# Patient Record
Sex: FEMALE | Race: WHITE | NOT HISPANIC OR LATINO | Employment: STUDENT | URBAN - METROPOLITAN AREA
[De-identification: names, ages, dates, MRNs, and addresses within clinical notes are randomized per-mention and may not be internally consistent; named-entity substitution may affect disease eponyms.]

---

## 2021-10-06 ENCOUNTER — PATIENT MESSAGE (OUTPATIENT)
Dept: DERMATOLOGY | Facility: CLINIC | Age: 26
End: 2021-10-06

## 2021-10-08 ENCOUNTER — OFFICE VISIT (OUTPATIENT)
Dept: DERMATOLOGY | Facility: CLINIC | Age: 26
End: 2021-10-08
Payer: COMMERCIAL

## 2021-10-08 DIAGNOSIS — D22.60 MULTIPLE BENIGN MELANOCYTIC NEVI OF UPPER EXTREMITY, LOWER EXTREMITY, AND TRUNK: ICD-10-CM

## 2021-10-08 DIAGNOSIS — D22.30 MELANOCYTIC NEVI OF FACE: ICD-10-CM

## 2021-10-08 DIAGNOSIS — D22.70 MULTIPLE BENIGN MELANOCYTIC NEVI OF UPPER EXTREMITY, LOWER EXTREMITY, AND TRUNK: ICD-10-CM

## 2021-10-08 DIAGNOSIS — D22.5 MULTIPLE BENIGN MELANOCYTIC NEVI OF UPPER EXTREMITY, LOWER EXTREMITY, AND TRUNK: ICD-10-CM

## 2021-10-08 DIAGNOSIS — L81.4 LENTIGINES: Primary | ICD-10-CM

## 2021-10-08 DIAGNOSIS — Z12.83 SCREENING EXAM FOR SKIN CANCER: ICD-10-CM

## 2021-10-08 DIAGNOSIS — D22.4 MELANOCYTIC NEVUS OF SCALP: ICD-10-CM

## 2021-10-08 PROCEDURE — 1160F RVW MEDS BY RX/DR IN RCRD: CPT | Mod: CPTII,S$GLB,, | Performed by: DERMATOLOGY

## 2021-10-08 PROCEDURE — 99203 OFFICE O/P NEW LOW 30 MIN: CPT | Mod: S$GLB,,, | Performed by: DERMATOLOGY

## 2021-10-08 PROCEDURE — 99203 PR OFFICE/OUTPT VISIT, NEW, LEVL III, 30-44 MIN: ICD-10-PCS | Mod: S$GLB,,, | Performed by: DERMATOLOGY

## 2021-10-08 PROCEDURE — 1159F PR MEDICATION LIST DOCUMENTED IN MEDICAL RECORD: ICD-10-PCS | Mod: CPTII,S$GLB,, | Performed by: DERMATOLOGY

## 2021-10-08 PROCEDURE — 1159F MED LIST DOCD IN RCRD: CPT | Mod: CPTII,S$GLB,, | Performed by: DERMATOLOGY

## 2021-10-08 PROCEDURE — 1160F PR REVIEW ALL MEDS BY PRESCRIBER/CLIN PHARMACIST DOCUMENTED: ICD-10-PCS | Mod: CPTII,S$GLB,, | Performed by: DERMATOLOGY

## 2021-11-04 ENCOUNTER — TELEPHONE (OUTPATIENT)
Dept: GYNECOLOGIC ONCOLOGY | Facility: CLINIC | Age: 26
End: 2021-11-04
Payer: COMMERCIAL

## 2021-11-09 ENCOUNTER — LAB VISIT (OUTPATIENT)
Dept: LAB | Facility: OTHER | Age: 26
End: 2021-11-09
Attending: FAMILY MEDICINE
Payer: COMMERCIAL

## 2021-11-09 ENCOUNTER — CLINICAL SUPPORT (OUTPATIENT)
Dept: GYNECOLOGIC ONCOLOGY | Facility: CLINIC | Age: 26
End: 2021-11-09
Payer: COMMERCIAL

## 2021-11-09 DIAGNOSIS — Z80.0 FAMILY HISTORY OF LYNCH SYNDROME: ICD-10-CM

## 2021-11-09 DIAGNOSIS — Z80.49 FAMILY HISTORY OF UTERINE CANCER: ICD-10-CM

## 2021-11-09 DIAGNOSIS — Z80.0 FAMILY HISTORY OF LYNCH SYNDROME: Primary | ICD-10-CM

## 2021-11-09 PROCEDURE — 36415 COLL VENOUS BLD VENIPUNCTURE: CPT | Performed by: OBSTETRICS & GYNECOLOGY

## 2021-11-23 LAB — COLARIS: NORMAL

## 2021-12-02 ENCOUNTER — TELEPHONE (OUTPATIENT)
Dept: GYNECOLOGIC ONCOLOGY | Facility: CLINIC | Age: 26
End: 2021-12-02
Payer: COMMERCIAL

## 2021-12-02 DIAGNOSIS — Z15.09 LYNCH SYNDROME: Primary | ICD-10-CM

## 2021-12-09 ENCOUNTER — TELEPHONE (OUTPATIENT)
Dept: GYNECOLOGIC ONCOLOGY | Facility: CLINIC | Age: 26
End: 2021-12-09
Payer: COMMERCIAL

## 2021-12-09 ENCOUNTER — PATIENT MESSAGE (OUTPATIENT)
Dept: GASTROENTEROLOGY | Facility: CLINIC | Age: 26
End: 2021-12-09
Payer: COMMERCIAL

## 2021-12-09 ENCOUNTER — PATIENT MESSAGE (OUTPATIENT)
Dept: GYNECOLOGIC ONCOLOGY | Facility: CLINIC | Age: 26
End: 2021-12-09
Payer: COMMERCIAL

## 2021-12-10 ENCOUNTER — PATIENT MESSAGE (OUTPATIENT)
Dept: GYNECOLOGIC ONCOLOGY | Facility: CLINIC | Age: 26
End: 2021-12-10
Payer: COMMERCIAL

## 2022-01-03 ENCOUNTER — HOSPITAL ENCOUNTER (OUTPATIENT)
Dept: RADIOLOGY | Facility: OTHER | Age: 27
Discharge: HOME | End: 2022-01-03
Attending: OBSTETRICS & GYNECOLOGY
Payer: COMMERCIAL

## 2022-01-03 DIAGNOSIS — Z15.09 LYNCH SYNDROME: ICD-10-CM

## 2022-01-03 PROCEDURE — 76856 US PELVIS COMP WITH TRANSVAG NON-OB (XPD): ICD-10-PCS | Mod: 26,,, | Performed by: RADIOLOGY

## 2022-01-03 PROCEDURE — 76830 TRANSVAGINAL US NON-OB: CPT | Mod: TC

## 2022-01-03 PROCEDURE — 76856 US EXAM PELVIC COMPLETE: CPT | Mod: 26,,, | Performed by: RADIOLOGY

## 2022-01-03 PROCEDURE — 76830 US PELVIS COMP WITH TRANSVAG NON-OB (XPD): ICD-10-PCS | Mod: 26,,, | Performed by: RADIOLOGY

## 2022-01-03 PROCEDURE — 76830 TRANSVAGINAL US NON-OB: CPT | Mod: 26,,, | Performed by: RADIOLOGY

## 2022-01-10 ENCOUNTER — OFFICE VISIT (OUTPATIENT)
Dept: GYNECOLOGIC ONCOLOGY | Facility: CLINIC | Age: 27
End: 2022-01-10
Payer: COMMERCIAL

## 2022-01-10 VITALS
DIASTOLIC BLOOD PRESSURE: 58 MMHG | WEIGHT: 145.31 LBS | SYSTOLIC BLOOD PRESSURE: 116 MMHG | BODY MASS INDEX: 24.55 KG/M2 | HEART RATE: 60 BPM

## 2022-01-10 DIAGNOSIS — Z15.09 LYNCH SYNDROME: ICD-10-CM

## 2022-01-10 PROCEDURE — 3078F DIAST BP <80 MM HG: CPT | Mod: CPTII,S$GLB,, | Performed by: OBSTETRICS & GYNECOLOGY

## 2022-01-10 PROCEDURE — 1159F PR MEDICATION LIST DOCUMENTED IN MEDICAL RECORD: ICD-10-PCS | Mod: CPTII,S$GLB,, | Performed by: OBSTETRICS & GYNECOLOGY

## 2022-01-10 PROCEDURE — 1160F PR REVIEW ALL MEDS BY PRESCRIBER/CLIN PHARMACIST DOCUMENTED: ICD-10-PCS | Mod: CPTII,S$GLB,, | Performed by: OBSTETRICS & GYNECOLOGY

## 2022-01-10 PROCEDURE — 99999 PR PBB SHADOW E&M-EST. PATIENT-LVL III: ICD-10-PCS | Mod: PBBFAC,,, | Performed by: OBSTETRICS & GYNECOLOGY

## 2022-01-10 PROCEDURE — 3074F PR MOST RECENT SYSTOLIC BLOOD PRESSURE < 130 MM HG: ICD-10-PCS | Mod: CPTII,S$GLB,, | Performed by: OBSTETRICS & GYNECOLOGY

## 2022-01-10 PROCEDURE — 99999 PR PBB SHADOW E&M-EST. PATIENT-LVL III: CPT | Mod: PBBFAC,,, | Performed by: OBSTETRICS & GYNECOLOGY

## 2022-01-10 PROCEDURE — 3008F PR BODY MASS INDEX (BMI) DOCUMENTED: ICD-10-PCS | Mod: CPTII,S$GLB,, | Performed by: OBSTETRICS & GYNECOLOGY

## 2022-01-10 PROCEDURE — 1160F RVW MEDS BY RX/DR IN RCRD: CPT | Mod: CPTII,S$GLB,, | Performed by: OBSTETRICS & GYNECOLOGY

## 2022-01-10 PROCEDURE — 99205 OFFICE O/P NEW HI 60 MIN: CPT | Mod: S$GLB,,, | Performed by: OBSTETRICS & GYNECOLOGY

## 2022-01-10 PROCEDURE — 99205 PR OFFICE/OUTPT VISIT, NEW, LEVL V, 60-74 MIN: ICD-10-PCS | Mod: S$GLB,,, | Performed by: OBSTETRICS & GYNECOLOGY

## 2022-01-10 PROCEDURE — 3074F SYST BP LT 130 MM HG: CPT | Mod: CPTII,S$GLB,, | Performed by: OBSTETRICS & GYNECOLOGY

## 2022-01-10 PROCEDURE — 3008F BODY MASS INDEX DOCD: CPT | Mod: CPTII,S$GLB,, | Performed by: OBSTETRICS & GYNECOLOGY

## 2022-01-10 PROCEDURE — 1159F MED LIST DOCD IN RCRD: CPT | Mod: CPTII,S$GLB,, | Performed by: OBSTETRICS & GYNECOLOGY

## 2022-01-10 PROCEDURE — 3078F PR MOST RECENT DIASTOLIC BLOOD PRESSURE < 80 MM HG: ICD-10-PCS | Mod: CPTII,S$GLB,, | Performed by: OBSTETRICS & GYNECOLOGY

## 2022-01-10 NOTE — Clinical Note
Hey.  Can you please see her for annual gyn care?  She needs a gyn until she transitions back to us.  She requested [facility] or [place] as she is a  and that would be easier.  Nothing special to do for her. Fernando

## 2022-01-10 NOTE — PROGRESS NOTES
Subjective:      Patient ID: Erma Garland is a 26 y.o. female.    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  Patient's mother had endometrial cancer, and this prompted the testing. Recently had a TVUS and CA-125 that were both normal.  Denies PSH.  LMP unknown due to continuous OCP's.  FH previously documented.  Mother was in her 40's when diagnosed with endometrial.  No breast or other gyn cancers.  No colon cancer.  She refuses exam today because she would like a female provider.  Also does not have a primary gyn.  Review of Systems   Constitutional: Negative for activity change, appetite change, chills, fatigue and fever.   HENT: Negative for hearing loss, mouth sores, nosebleeds, sore throat and tinnitus.    Eyes: Negative for visual disturbance.   Respiratory: Negative for cough, chest tightness, shortness of breath and wheezing.    Cardiovascular: Negative for chest pain and leg swelling.   Gastrointestinal: Negative for abdominal distention, abdominal pain, blood in stool, constipation, diarrhea, nausea and vomiting.   Genitourinary: Negative for dysuria, flank pain, frequency, hematuria, pelvic pain, vaginal bleeding, vaginal discharge and vaginal pain.   Musculoskeletal: Negative for arthralgias and back pain.   Skin: Negative for rash.   Neurological: Negative for dizziness, seizures, syncope, weakness and numbness.   Hematological: Does not bruise/bleed easily.   Psychiatric/Behavioral: Negative for confusion and sleep disturbance. The patient is not nervous/anxious.        Past Medical History:   Diagnosis Date    Acne      History reviewed. No pertinent surgical history.  Family History   Problem Relation Age of Onset    Ovarian cancer Mother     Melanoma Neg Hx      Social History     Socioeconomic History    Marital status: Single    Number of  children: 0   Occupational History    Occupation: student     Comment: [employer]   Tobacco Use    Smoking status: Never Smoker    Smokeless tobacco: Never Used   Substance and Sexual Activity    Alcohol use: No    Drug use: No    Sexual activity: Never   Other Topics Concern    Are you pregnant or think you may be? No    Breast-feeding No     Current Outpatient Medications   Medication Sig    alprazolam (XANAX) 0.25 MG tablet Take 1 tablet (0.25 mg total) by mouth daily as needed for Anxiety (prior to flying).    norgestimate-ethinyl estradiol (TRI-PREVIFEM, 28,) 0.18/0.215/0.25 mg-35 mcg (28) tablet Take 1 tablet by mouth once daily.     No current facility-administered medications for this visit.     Review of patient's allergies indicates:   Allergen Reactions    Doxycycline      Hives from sun       Objective:   Physical Exam:   Constitutional: She is oriented to person, place, and time. She appears well-developed and well-nourished. No distress.    HENT:   Head: Normocephalic and atraumatic.    Eyes: No scleral icterus.     Cardiovascular: Normal rate and intact distal pulses.  Exam reveals no cyanosis and no edema.     Pulmonary/Chest: Effort normal. No respiratory distress. She exhibits no tenderness.        Abdominal: Normal appearance. She exhibits no distension and no ascites. There is no rigidity.     Genitourinary:    Vagina and uterus normal.   Cervix is normal. Right adnexum displays no mass, no tenderness and no fullness. Left adnexum displays no mass, no tenderness and no fullness. No  no vaginal discharge or bleeding in the vagina. Uterus is not tender.           Musculoskeletal: Normal range of motion and moves all extremeties. No edema.      Lymphadenopathy:     She has no cervical adenopathy.        Right: No inguinal adenopathy present.        Left: No inguinal adenopathy present.    Neurological: She is alert and oriented to person, place, and time.    Skin: Skin is warm. No rash  noted. No cyanosis or erythema.    Psychiatric: She has a normal mood and affect. Thought content normal.       Assessment:     1. Saravia syndrome        Plan:       Patient counseled on her MSH2 mutation and impact moving forward.  Understands that with baseline negative testing, there is nothing to do now from a surveillance standpoint except for annual visits.  Increased screening to possibly include EMB will begin at age 31.  Has GI referral already and was seen in the breast clinic and has established care.  Will be transitioning off her parents medical insurance policy at the end of this year. Continue OCP's for now given protective ovarian cancer benefit.  Plan to have her see Ari when she starts and will find interim gyn provider in [facility] until then.

## 2022-01-10 NOTE — Clinical Note
Ari doesn't have a schedule yet, but she will need to see her in September or October to establish care.

## 2022-01-29 ENCOUNTER — TELEPHONE (OUTPATIENT)
Dept: ENDOSCOPY | Facility: HOSPITAL | Age: 27
End: 2022-01-29
Payer: COMMERCIAL

## 2022-01-31 ENCOUNTER — LAB VISIT (OUTPATIENT)
Dept: LAB | Facility: HOSPITAL | Age: 27
End: 2022-01-31
Attending: INTERNAL MEDICINE
Payer: COMMERCIAL

## 2022-01-31 ENCOUNTER — OFFICE VISIT (OUTPATIENT)
Dept: INTERNAL MEDICINE | Facility: CLINIC | Age: 27
End: 2022-01-31
Payer: COMMERCIAL

## 2022-01-31 VITALS
SYSTOLIC BLOOD PRESSURE: 114 MMHG | OXYGEN SATURATION: 98 % | HEART RATE: 64 BPM | WEIGHT: 147 LBS | BODY MASS INDEX: 27.05 KG/M2 | HEIGHT: 62 IN | DIASTOLIC BLOOD PRESSURE: 62 MMHG

## 2022-01-31 DIAGNOSIS — Z15.09 LYNCH SYNDROME: ICD-10-CM

## 2022-01-31 DIAGNOSIS — Z00.00 VISIT FOR ANNUAL HEALTH EXAMINATION: Primary | ICD-10-CM

## 2022-01-31 DIAGNOSIS — F32.A DEPRESSION, UNSPECIFIED DEPRESSION TYPE: ICD-10-CM

## 2022-01-31 DIAGNOSIS — F41.1 GAD (GENERALIZED ANXIETY DISORDER): ICD-10-CM

## 2022-01-31 DIAGNOSIS — F41.8 SITUATIONAL ANXIETY: ICD-10-CM

## 2022-01-31 DIAGNOSIS — Z12.4 ENCOUNTER FOR SCREENING FOR CERVICAL CANCER: ICD-10-CM

## 2022-01-31 DIAGNOSIS — Z80.0 FAMILY HISTORY OF LYNCH SYNDROME: ICD-10-CM

## 2022-01-31 LAB
ALBUMIN SERPL BCP-MCNC: 4 G/DL (ref 3.5–5.2)
ALP SERPL-CCNC: 34 U/L (ref 55–135)
ALT SERPL W/O P-5'-P-CCNC: 16 U/L (ref 10–44)
ANION GAP SERPL CALC-SCNC: 9 MMOL/L (ref 8–16)
AST SERPL-CCNC: 20 U/L (ref 10–40)
BASOPHILS # BLD AUTO: 0.03 K/UL (ref 0–0.2)
BASOPHILS NFR BLD: 0.6 % (ref 0–1.9)
BILIRUB SERPL-MCNC: 0.3 MG/DL (ref 0.1–1)
BUN SERPL-MCNC: 8 MG/DL (ref 6–20)
CALCIUM SERPL-MCNC: 9.3 MG/DL (ref 8.7–10.5)
CHLORIDE SERPL-SCNC: 106 MMOL/L (ref 95–110)
CHOLEST SERPL-MCNC: 199 MG/DL (ref 120–199)
CHOLEST/HDLC SERPL: 3.3 {RATIO} (ref 2–5)
CO2 SERPL-SCNC: 22 MMOL/L (ref 23–29)
CREAT SERPL-MCNC: 0.8 MG/DL (ref 0.5–1.4)
DIFFERENTIAL METHOD: ABNORMAL
EOSINOPHIL # BLD AUTO: 0.2 K/UL (ref 0–0.5)
EOSINOPHIL NFR BLD: 4.5 % (ref 0–8)
ERYTHROCYTE [DISTWIDTH] IN BLOOD BY AUTOMATED COUNT: 12.5 % (ref 11.5–14.5)
EST. GFR  (AFRICAN AMERICAN): >60 ML/MIN/1.73 M^2
EST. GFR  (NON AFRICAN AMERICAN): >60 ML/MIN/1.73 M^2
ESTIMATED AVG GLUCOSE: 103 MG/DL (ref 68–131)
GLUCOSE SERPL-MCNC: 57 MG/DL (ref 70–110)
HBA1C MFR BLD: 5.2 % (ref 4–5.6)
HCT VFR BLD AUTO: 39.6 % (ref 37–48.5)
HDLC SERPL-MCNC: 60 MG/DL (ref 40–75)
HDLC SERPL: 30.2 % (ref 20–50)
HGB BLD-MCNC: 12.3 G/DL (ref 12–16)
IMM GRANULOCYTES # BLD AUTO: 0.01 K/UL (ref 0–0.04)
IMM GRANULOCYTES NFR BLD AUTO: 0.2 % (ref 0–0.5)
LDLC SERPL CALC-MCNC: 125.4 MG/DL (ref 63–159)
LYMPHOCYTES # BLD AUTO: 2.2 K/UL (ref 1–4.8)
LYMPHOCYTES NFR BLD: 46 % (ref 18–48)
MCH RBC QN AUTO: 28.3 PG (ref 27–31)
MCHC RBC AUTO-ENTMCNC: 31.1 G/DL (ref 32–36)
MCV RBC AUTO: 91 FL (ref 82–98)
MONOCYTES # BLD AUTO: 0.4 K/UL (ref 0.3–1)
MONOCYTES NFR BLD: 8.1 % (ref 4–15)
NEUTROPHILS # BLD AUTO: 1.9 K/UL (ref 1.8–7.7)
NEUTROPHILS NFR BLD: 40.6 % (ref 38–73)
NONHDLC SERPL-MCNC: 139 MG/DL
NRBC BLD-RTO: 0 /100 WBC
PLATELET # BLD AUTO: 201 K/UL (ref 150–450)
PMV BLD AUTO: 11 FL (ref 9.2–12.9)
POTASSIUM SERPL-SCNC: 4.2 MMOL/L (ref 3.5–5.1)
PROT SERPL-MCNC: 7.5 G/DL (ref 6–8.4)
RBC # BLD AUTO: 4.34 M/UL (ref 4–5.4)
SODIUM SERPL-SCNC: 137 MMOL/L (ref 136–145)
TRIGL SERPL-MCNC: 68 MG/DL (ref 30–150)
TSH SERPL DL<=0.005 MIU/L-ACNC: 2.12 UIU/ML (ref 0.4–4)
WBC # BLD AUTO: 4.7 K/UL (ref 3.9–12.7)

## 2022-01-31 PROCEDURE — 80061 LIPID PANEL: CPT | Performed by: INTERNAL MEDICINE

## 2022-01-31 PROCEDURE — 3078F PR MOST RECENT DIASTOLIC BLOOD PRESSURE < 80 MM HG: ICD-10-PCS | Mod: CPTII,S$GLB,, | Performed by: INTERNAL MEDICINE

## 2022-01-31 PROCEDURE — 99999 PR PBB SHADOW E&M-EST. PATIENT-LVL IV: ICD-10-PCS | Mod: PBBFAC,,, | Performed by: INTERNAL MEDICINE

## 2022-01-31 PROCEDURE — 36415 COLL VENOUS BLD VENIPUNCTURE: CPT | Performed by: INTERNAL MEDICINE

## 2022-01-31 PROCEDURE — 1160F RVW MEDS BY RX/DR IN RCRD: CPT | Mod: CPTII,S$GLB,, | Performed by: INTERNAL MEDICINE

## 2022-01-31 PROCEDURE — 3008F BODY MASS INDEX DOCD: CPT | Mod: CPTII,S$GLB,, | Performed by: INTERNAL MEDICINE

## 2022-01-31 PROCEDURE — 3074F PR MOST RECENT SYSTOLIC BLOOD PRESSURE < 130 MM HG: ICD-10-PCS | Mod: CPTII,S$GLB,, | Performed by: INTERNAL MEDICINE

## 2022-01-31 PROCEDURE — 3008F PR BODY MASS INDEX (BMI) DOCUMENTED: ICD-10-PCS | Mod: CPTII,S$GLB,, | Performed by: INTERNAL MEDICINE

## 2022-01-31 PROCEDURE — 1159F PR MEDICATION LIST DOCUMENTED IN MEDICAL RECORD: ICD-10-PCS | Mod: CPTII,S$GLB,, | Performed by: INTERNAL MEDICINE

## 2022-01-31 PROCEDURE — 99385 PR PREVENTIVE VISIT,NEW,18-39: ICD-10-PCS | Mod: S$GLB,,, | Performed by: INTERNAL MEDICINE

## 2022-01-31 PROCEDURE — 3078F DIAST BP <80 MM HG: CPT | Mod: CPTII,S$GLB,, | Performed by: INTERNAL MEDICINE

## 2022-01-31 PROCEDURE — 99999 PR PBB SHADOW E&M-EST. PATIENT-LVL IV: CPT | Mod: PBBFAC,,, | Performed by: INTERNAL MEDICINE

## 2022-01-31 PROCEDURE — 85025 COMPLETE CBC W/AUTO DIFF WBC: CPT | Performed by: INTERNAL MEDICINE

## 2022-01-31 PROCEDURE — 1160F PR REVIEW ALL MEDS BY PRESCRIBER/CLIN PHARMACIST DOCUMENTED: ICD-10-PCS | Mod: CPTII,S$GLB,, | Performed by: INTERNAL MEDICINE

## 2022-01-31 PROCEDURE — 1159F MED LIST DOCD IN RCRD: CPT | Mod: CPTII,S$GLB,, | Performed by: INTERNAL MEDICINE

## 2022-01-31 PROCEDURE — 3074F SYST BP LT 130 MM HG: CPT | Mod: CPTII,S$GLB,, | Performed by: INTERNAL MEDICINE

## 2022-01-31 PROCEDURE — 84443 ASSAY THYROID STIM HORMONE: CPT | Performed by: INTERNAL MEDICINE

## 2022-01-31 PROCEDURE — 83036 HEMOGLOBIN GLYCOSYLATED A1C: CPT | Performed by: INTERNAL MEDICINE

## 2022-01-31 PROCEDURE — 99385 PREV VISIT NEW AGE 18-39: CPT | Mod: S$GLB,,, | Performed by: INTERNAL MEDICINE

## 2022-01-31 PROCEDURE — 80053 COMPREHEN METABOLIC PANEL: CPT | Performed by: INTERNAL MEDICINE

## 2022-01-31 RX ORDER — ALPRAZOLAM 0.25 MG/1
0.25 TABLET ORAL DAILY PRN
Qty: 5 TABLET | Refills: 0 | Status: SHIPPED | OUTPATIENT
Start: 2022-01-31 | End: 2022-04-07 | Stop reason: SDUPTHER

## 2022-01-31 NOTE — PROGRESS NOTES
"INTERNAL MEDICINE INITIAL VISIT NOTE      CHIEF COMPLAINT     Chief Complaint   Patient presents with    Annual Exam    Establish Care    Anxiety       HPI     Erma Garland is a 26 y.o. female with recently diagnosed Saravia syndrome, KALEIGH, depression, here today to establish care. PCP listed as Dr. Erma Rudolph, transferring care.    Mood disorders overall well-controlled. Not currently seeing a therapist. Previously on Fluoxetine, has been off medications for some time. Requesting refill of Xanax due to upcoming flight; previously has had panic attack during flight.     Recent evaluation by Gyn-Onc given Saravia syndrome diagnosis. Has MSH2 mutation; CA-125 and TVUS both normal.     Upcoming appt with Gastroenterology. Occasionally experiences abdominal bloating, would like to know which probiotic to take.      Past Medical History:  Past Medical History:   Diagnosis Date    Acne     Anxiety     Depression     Saravia syndrome        Review of Systems:  Review of Systems   Constitutional: Negative for chills and fever.   HENT: Negative for congestion.    Respiratory: Negative for cough and shortness of breath.    Cardiovascular: Negative for chest pain.   Gastrointestinal: Negative for constipation, nausea and vomiting.   Genitourinary: Negative for hematuria and urgency.   Musculoskeletal: Negative for falls.   Skin: Negative for rash.   Neurological: Negative for dizziness and loss of consciousness.       Health Maintenance:   Immunizations:   Influenza - defer  Tdap - next visit  Covid 19 - complete  HPV - complete  Prevnar rec at 66  Shingrix rec at 51    Cancer Screening:  PAP: refer to Gyn  Mammogram:  n/a  Colonoscopy:  n/a  DEXA:  n/a      PHYSICAL EXAM     /62 (BP Location: Left arm, Patient Position: Sitting, BP Method: Medium (Manual))   Pulse 64   Ht 5' 2" (1.575 m)   Wt 66.7 kg (147 lb)   LMP 12/25/2021 (Approximate)   SpO2 98%   BMI 26.89 kg/m²     GEN - A+OX4, NAD   HEENT - " PERRL, EOMI,   Neck - No thyromegaly or thyroid masses felt.  No cervical lymphadenopathy appreciated.  CV - RRR, no m/r/g  Chest - CTAB, no wheezing, crackles, or rhonchi  Abd - S/NT/ND/+BS.   Ext - 2+BDP. No C/C/E.  Skin - Normal color and texture, no rash, no skin lesions.    ASSESSMENT/PLAN     Erma Garland is a 26 y.o. female with conditions as above.     Visit for annual health examination    Saravia syndrome  MSH2 mutation   Established with Gyn-Onc, CA-125 and TVUS both normal  Upcoming appt with GI, discuss timeline of starting CRC screening  -     CBC Auto Differential; Future; Expected date: 01/31/2022  -     Comprehensive Metabolic Panel; Future; Expected date: 01/31/2022  -     Hemoglobin A1C; Future; Expected date: 01/31/2022  -     Lipid Panel; Future; Expected date: 01/31/2022    KALEIGH (generalized anxiety disorder)  Stable  -     TSH; Future; Expected date: 01/31/2022  -     ALPRAZolam (XANAX) 0.25 MG tablet; Take 1 tablet (0.25 mg total) by mouth daily as needed for Anxiety (prior to flying).  Dispense: 5 tablet; Refill: 0    Depression, unspecified depression type  Stable    Situational anxiety   checked, limited quantity of Xanax for upcoming travel  -     ALPRAZolam (XANAX) 0.25 MG tablet; Take 1 tablet (0.25 mg total) by mouth daily as needed for Anxiety (prior to flying).  Dispense: 5 tablet; Refill: 0    Encounter for screening for cervical cancer  -     Ambulatory referral/consult to Gynecology; Future; Expected date: 02/07/2022    Family history of Saravia syndrome  Mother with endometrial cancer; no relatives with CRC    HM as above    RTC in 1 year, sooner if needed and depending on labs.    Leann Dalal MD  Department of Internal Medicine - [facility]  01/31/2022

## 2022-02-02 ENCOUNTER — PATIENT MESSAGE (OUTPATIENT)
Dept: ENDOSCOPY | Facility: HOSPITAL | Age: 27
End: 2022-02-02
Payer: COMMERCIAL

## 2022-02-11 ENCOUNTER — OFFICE VISIT (OUTPATIENT)
Dept: GASTROENTEROLOGY | Facility: CLINIC | Age: 27
End: 2022-02-11
Payer: COMMERCIAL

## 2022-02-11 VITALS
SYSTOLIC BLOOD PRESSURE: 123 MMHG | HEIGHT: 64 IN | WEIGHT: 144.81 LBS | HEART RATE: 90 BPM | BODY MASS INDEX: 24.72 KG/M2 | OXYGEN SATURATION: 100 % | DIASTOLIC BLOOD PRESSURE: 60 MMHG

## 2022-02-11 DIAGNOSIS — Z15.09 LYNCH SYNDROME: Primary | ICD-10-CM

## 2022-02-11 PROCEDURE — 99203 OFFICE O/P NEW LOW 30 MIN: CPT | Mod: S$GLB,,, | Performed by: INTERNAL MEDICINE

## 2022-02-11 PROCEDURE — 99203 PR OFFICE/OUTPT VISIT, NEW, LEVL III, 30-44 MIN: ICD-10-PCS | Mod: S$GLB,,, | Performed by: INTERNAL MEDICINE

## 2022-02-11 PROCEDURE — 99999 PR PBB SHADOW E&M-EST. PATIENT-LVL IV: ICD-10-PCS | Mod: PBBFAC,,, | Performed by: INTERNAL MEDICINE

## 2022-02-11 PROCEDURE — 99999 PR PBB SHADOW E&M-EST. PATIENT-LVL IV: CPT | Mod: PBBFAC,,, | Performed by: INTERNAL MEDICINE

## 2022-02-11 NOTE — PROGRESS NOTES
[facility] Consultation     Reason for Consult:  The encounter diagnosis was Saravia syndrome.    PCP:   Eulalio Dalal   [address] [facility] [address]    Referring MD:  Fernando Zarate Md  [address] [place] [address]    HPI:  This is a 26 y.o. female with Saravia syndrome (MSH2 genetic mutation), anxiety who presents to GI clinic.     Mother with ovarian and endometrial cancer. Patient and sister had genetic testing done in November 2021.   Maternal grandfather with brain cancer.  Maternal aunt with thyroid cancer.    Paternal grandmother also with cancer, unclear specifics.  Denies family history of pancreatic cancer or small bowel cancers.    Drinks alcohol socially. Denies tobacco abuse. Smokes THC for medicinal purposes daily.    Denies any rectal bleeding. Brown in color.   She takes probiotics for bloating. She feels that the probiotics help. Denies nausea, vomiting, denies weight loss.     She is taking oral contraceptives.  Used to take medications for anxiety/depression - not taking them.      ROS:  Constitutional: No fevers, chills, No weight loss  ENT: No allergies  CV: No chest pain  Pulm: No cough, No shortness of breath  Ophtho: No vision changes  GI: see HPI  Derm: No rash  Heme: No lymphadenopathy, No bruising  MSK: No arthritis  : No dysuria, No hematuria  Endo: No hot or cold intolerance  Neuro: No syncope, No seizure  Psych: No anxiety, No depression    Medical History:  has a past medical history of Acne, Anxiety, Depression, and Saravia syndrome.    Surgical History:  has a past surgical history that includes [place] tooth extraction.    Family History: family history includes Ovarian cancer in her mother..   No contributory family history     Social History:  reports that she has never smoked. She has never used smokeless tobacco. She reports current alcohol use of about 4.0 standard drinks of alcohol per week. She reports that she does not use  "drugs.    Review of patient's allergies indicates:   Allergen Reactions    Latex, natural rubber Itching and Swelling    Doxycycline      Hives from sun       Current Outpatient Rx   Medication Sig Dispense Refill    ALPRAZolam (XANAX) 0.25 MG tablet Take 1 tablet (0.25 mg total) by mouth daily as needed for Anxiety (prior to flying). 5 tablet 0    norgestimate-ethinyl estradiol (TRI-PREVIFEM, 28,) 0.18/0.215/0.25 mg-35 mcg (28) tablet Take 1 tablet by mouth once daily. 28 tablet 11       Objective Findings:    Vital Signs:  /60 (BP Location: Left arm, Patient Position: Sitting, BP Method: Medium (Automatic))   Pulse 90   Ht 5' 4" (1.626 m)   Wt 65.7 kg (144 lb 13.5 oz)   SpO2 100%   BMI 24.86 kg/m²   Body mass index is 24.86 kg/m².    Physical Exam:  General Appearance: well-appearing, NAD  Head:   Normocephalic, without obvious abnormality  Eyes:    No scleral icterus, EOMI  ENT: Neck supple; moist mucus membranes  Lungs: clear to auscultation bilaterally.  Heart:  regular rate and rhythm, S1, S2 normal, no murmurs heard  Abdomen: soft, non-tender, non-distended with bowel sounds in all four quadrants. No hepatosplenomegaly, ascites, or palpable masses  Extremities: 2+ pulses, no clubbing, cyanosis or edema  Skin: No visible rash  Neurologic: no focal motor deficits      Labs:  Lab Results   Component Value Date    WBC 4.70 01/31/2022    HGB 12.3 01/31/2022    HCT 39.6 01/31/2022     01/31/2022    CHOL 199 01/31/2022    TRIG 68 01/31/2022    HDL 60 01/31/2022    ALT 16 01/31/2022    AST 20 01/31/2022     01/31/2022    K 4.2 01/31/2022     01/31/2022    CREATININE 0.8 01/31/2022    BUN 8 01/31/2022    CO2 22 (L) 01/31/2022    TSH 2.117 01/31/2022    HGBA1C 5.2 01/31/2022       IMAGING:  US PELVIS 12/2021  Impression:  Unremarkable pelvic ultrasound.    PROCEDURES:  None on file    Assessment:  This is a 26 y.o. female with Saravia syndrome (MSH2 genetic mutation), anxiety who " presents to GI clinic.     Recommendations  -Colonoscopy now for CRC screening and every 1-2 years; case request placed  -Gastric cancer screening to begin with EGD at the age of 30-35 every 2-4 years  -Small intestinal cancer screening not routinely recommended unless family history of small bowel cancer in which case can perform VCE every 3-5 years  -Referral to genetics placed  -Begin ASA 81 daily for chemoprevention    Of note, patient states she may lose healthcare insurance this coming April - advised her to continue routine follow up with the appropriate specialities for her Saravia syndrome surveillance.    Order summary:  Orders Placed This Encounter    Ambulatory referral/consult to Genetics    Case Request Endoscopy: COLONOSCOPY         Thank you so much for allowing me to participate in the care of Erma Barbosa MD  Gastroenterology   [facility]

## 2022-02-15 ENCOUNTER — PATIENT MESSAGE (OUTPATIENT)
Dept: ENDOSCOPY | Facility: HOSPITAL | Age: 27
End: 2022-02-15
Payer: COMMERCIAL

## 2022-02-15 DIAGNOSIS — Z12.11 SPECIAL SCREENING FOR MALIGNANT NEOPLASMS, COLON: Primary | ICD-10-CM

## 2022-02-15 RX ORDER — POLYETHYLENE GLYCOL 3350, SODIUM SULFATE ANHYDROUS, SODIUM BICARBONATE, SODIUM CHLORIDE, POTASSIUM CHLORIDE 236; 22.74; 6.74; 5.86; 2.97 G/4L; G/4L; G/4L; G/4L; G/4L
4 POWDER, FOR SOLUTION ORAL ONCE
Qty: 4000 ML | Refills: 0 | Status: SHIPPED | OUTPATIENT
Start: 2022-02-15 | End: 2022-02-15

## 2022-02-27 ENCOUNTER — PATIENT MESSAGE (OUTPATIENT)
Dept: ENDOSCOPY | Facility: HOSPITAL | Age: 27
End: 2022-02-27
Payer: COMMERCIAL

## 2022-02-28 ENCOUNTER — PATIENT MESSAGE (OUTPATIENT)
Dept: ENDOSCOPY | Facility: HOSPITAL | Age: 27
End: 2022-02-28
Payer: COMMERCIAL

## 2022-02-28 DIAGNOSIS — Z12.11 COLON CANCER SCREENING: Primary | ICD-10-CM

## 2022-02-28 RX ORDER — POLYETHYLENE GLYCOL 3350, SODIUM SULFATE ANHYDROUS, SODIUM BICARBONATE, SODIUM CHLORIDE, POTASSIUM CHLORIDE 236; 22.74; 6.74; 5.86; 2.97 G/4L; G/4L; G/4L; G/4L; G/4L
4 POWDER, FOR SOLUTION ORAL ONCE
Qty: 4000 ML | Refills: 0 | Status: SHIPPED | OUTPATIENT
Start: 2022-02-28 | End: 2022-02-28

## 2022-03-02 DIAGNOSIS — Z12.11 COLON CANCER SCREENING: Primary | ICD-10-CM

## 2022-03-02 RX ORDER — SODIUM, POTASSIUM,MAG SULFATES 17.5-3.13G
1 SOLUTION, RECONSTITUTED, ORAL ORAL DAILY
Qty: 1 KIT | Refills: 0 | Status: SHIPPED | OUTPATIENT
Start: 2022-03-02 | End: 2022-03-04

## 2022-03-04 DIAGNOSIS — Z12.11 SCREEN FOR COLON CANCER: Primary | ICD-10-CM

## 2022-03-04 RX ORDER — SOD SULF/POT CHLORIDE/MAG SULF 1.479 G
12 TABLET ORAL DAILY
Qty: 24 TABLET | Refills: 0 | Status: SHIPPED | OUTPATIENT
Start: 2022-03-04 | End: 2023-08-02

## 2022-03-07 ENCOUNTER — PATIENT MESSAGE (OUTPATIENT)
Dept: INTERNAL MEDICINE | Facility: CLINIC | Age: 27
End: 2022-03-07
Payer: COMMERCIAL

## 2022-03-16 ENCOUNTER — PATIENT MESSAGE (OUTPATIENT)
Dept: ADMINISTRATIVE | Facility: HOSPITAL | Age: 27
End: 2022-03-16
Payer: COMMERCIAL

## 2022-03-17 ENCOUNTER — PATIENT MESSAGE (OUTPATIENT)
Dept: INTERNAL MEDICINE | Facility: CLINIC | Age: 27
End: 2022-03-17
Payer: COMMERCIAL

## 2022-03-17 DIAGNOSIS — G47.9 SLEEPING DIFFICULTIES: Primary | ICD-10-CM

## 2022-03-17 RX ORDER — HYDROXYZINE HYDROCHLORIDE 10 MG/1
10 TABLET, FILM COATED ORAL NIGHTLY PRN
Qty: 30 TABLET | Refills: 1 | Status: SHIPPED | OUTPATIENT
Start: 2022-03-17 | End: 2022-04-12

## 2022-03-23 ENCOUNTER — OFFICE VISIT (OUTPATIENT)
Dept: OBSTETRICS AND GYNECOLOGY | Facility: CLINIC | Age: 27
End: 2022-03-23
Payer: COMMERCIAL

## 2022-03-23 VITALS
WEIGHT: 143.94 LBS | BODY MASS INDEX: 24.57 KG/M2 | HEIGHT: 64 IN | SYSTOLIC BLOOD PRESSURE: 110 MMHG | DIASTOLIC BLOOD PRESSURE: 70 MMHG

## 2022-03-23 DIAGNOSIS — Z30.011 ENCOUNTER FOR INITIAL PRESCRIPTION OF CONTRACEPTIVE PILLS: ICD-10-CM

## 2022-03-23 DIAGNOSIS — Z15.09 LYNCH SYNDROME: ICD-10-CM

## 2022-03-23 DIAGNOSIS — Z01.419 WELL WOMAN EXAM WITH ROUTINE GYNECOLOGICAL EXAM: Primary | ICD-10-CM

## 2022-03-23 PROCEDURE — 3008F BODY MASS INDEX DOCD: CPT | Mod: CPTII,S$GLB,, | Performed by: OBSTETRICS & GYNECOLOGY

## 2022-03-23 PROCEDURE — 3044F HG A1C LEVEL LT 7.0%: CPT | Mod: CPTII,S$GLB,, | Performed by: OBSTETRICS & GYNECOLOGY

## 2022-03-23 PROCEDURE — 3074F SYST BP LT 130 MM HG: CPT | Mod: CPTII,S$GLB,, | Performed by: OBSTETRICS & GYNECOLOGY

## 2022-03-23 PROCEDURE — 3044F PR MOST RECENT HEMOGLOBIN A1C LEVEL <7.0%: ICD-10-PCS | Mod: CPTII,S$GLB,, | Performed by: OBSTETRICS & GYNECOLOGY

## 2022-03-23 PROCEDURE — 1159F MED LIST DOCD IN RCRD: CPT | Mod: CPTII,S$GLB,, | Performed by: OBSTETRICS & GYNECOLOGY

## 2022-03-23 PROCEDURE — 3074F PR MOST RECENT SYSTOLIC BLOOD PRESSURE < 130 MM HG: ICD-10-PCS | Mod: CPTII,S$GLB,, | Performed by: OBSTETRICS & GYNECOLOGY

## 2022-03-23 PROCEDURE — 87624 HPV HI-RISK TYP POOLED RSLT: CPT | Performed by: OBSTETRICS & GYNECOLOGY

## 2022-03-23 PROCEDURE — 1159F PR MEDICATION LIST DOCUMENTED IN MEDICAL RECORD: ICD-10-PCS | Mod: CPTII,S$GLB,, | Performed by: OBSTETRICS & GYNECOLOGY

## 2022-03-23 PROCEDURE — 3078F DIAST BP <80 MM HG: CPT | Mod: CPTII,S$GLB,, | Performed by: OBSTETRICS & GYNECOLOGY

## 2022-03-23 PROCEDURE — 99395 PREV VISIT EST AGE 18-39: CPT | Mod: S$GLB,,, | Performed by: OBSTETRICS & GYNECOLOGY

## 2022-03-23 PROCEDURE — 99395 PR PREVENTIVE VISIT,EST,18-39: ICD-10-PCS | Mod: S$GLB,,, | Performed by: OBSTETRICS & GYNECOLOGY

## 2022-03-23 PROCEDURE — 99999 PR PBB SHADOW E&M-EST. PATIENT-LVL III: CPT | Mod: PBBFAC,,, | Performed by: OBSTETRICS & GYNECOLOGY

## 2022-03-23 PROCEDURE — 3078F PR MOST RECENT DIASTOLIC BLOOD PRESSURE < 80 MM HG: ICD-10-PCS | Mod: CPTII,S$GLB,, | Performed by: OBSTETRICS & GYNECOLOGY

## 2022-03-23 PROCEDURE — 88175 CYTOPATH C/V AUTO FLUID REDO: CPT | Performed by: OBSTETRICS & GYNECOLOGY

## 2022-03-23 PROCEDURE — 3008F PR BODY MASS INDEX (BMI) DOCUMENTED: ICD-10-PCS | Mod: CPTII,S$GLB,, | Performed by: OBSTETRICS & GYNECOLOGY

## 2022-03-23 PROCEDURE — 99999 PR PBB SHADOW E&M-EST. PATIENT-LVL III: ICD-10-PCS | Mod: PBBFAC,,, | Performed by: OBSTETRICS & GYNECOLOGY

## 2022-03-23 RX ORDER — NORGESTIMATE AND ETHINYL ESTRADIOL 7DAYSX3 28
1 KIT ORAL DAILY
Qty: 84 TABLET | Refills: 3 | Status: SHIPPED | OUTPATIENT
Start: 2022-03-23 | End: 2022-04-08

## 2022-03-23 NOTE — PROGRESS NOTES
History & Physical  Gynecology      SUBJECTIVE:     Chief Complaint: Well Woman       History of Present Illness:  Annual Exam-Premenopausal  Patient presents for annual exam. The patient has no complaints today. Menstrual cycles are monthly.  The patient is sexually active. GYN screening history: last pap: approximate date 3 years ago  and was normal. The patient participates in regular exercise: yes.  Smoking status:  no cigarettes.    Contraception: OCP    FH:  Breast cancer: neg  Colon cancer: neg  Ovarian and Uterine cancer: mother, MSH2 mutation- Saravia    Review of patient's allergies indicates:   Allergen Reactions    Latex, natural rubber Itching and Swelling    Doxycycline      Hives from sun       Past Medical History:   Diagnosis Date    Acne     Anxiety     Depression     Saravia syndrome      Past Surgical History:   Procedure Laterality Date    WISDOM TOOTH EXTRACTION       OB History        0    Para   0    Term   0       0    AB   0    Living   0       SAB   0    IAB   0    Ectopic   0    Multiple   0    Live Births   0               Family History   Problem Relation Age of Onset    Ovarian cancer Mother         s/p hys    Endometrial cancer Mother     Melanoma Neg Hx      Social History     Tobacco Use    Smoking status: Never Smoker    Smokeless tobacco: Never Used   Substance Use Topics    Alcohol use: Yes     Alcohol/week: 4.0 standard drinks     Types: 1 Glasses of wine, 1 Cans of beer, 2 Shots of liquor per week     Comment: 3-5 drinks weekly     Drug use: No       Current Outpatient Medications   Medication Sig    ALPRAZolam (XANAX) 0.25 MG tablet Take 1 tablet (0.25 mg total) by mouth daily as needed for Anxiety (prior to flying).    hydrOXYzine HCL (ATARAX) 10 MG Tab Take 1 tablet (10 mg total) by mouth nightly as needed (insomnia).    norgestimate-ethinyl estradioL (TRI-PREVIFEM, 28,) 0.18/0.215/0.25 mg-35 mcg (28) tablet Take 1 tablet by mouth once daily.     sod sulf-pot chloride-mag sulf (SUTAB) 1.479-0.188- 0.225 gram tablet Take 12 tablets by mouth once daily. Take according to package instructions with indicated amount of water.     No current facility-administered medications for this visit.         Review of Systems:  Review of Systems   Constitutional: Negative for appetite change, fever and unexpected weight change.   Respiratory: Negative for shortness of breath.    Cardiovascular: Negative for chest pain.   Gastrointestinal: Negative for nausea and vomiting.   Genitourinary: Negative for menorrhagia, menstrual problem, pelvic pain, vaginal bleeding, vaginal discharge and vaginal pain.   Integumentary:  Negative for breast mass.   Breast: Negative for lump and mass       OBJECTIVE:     Physical Exam:  Physical Exam  Vitals and nursing note reviewed.   Constitutional:       Appearance: She is well-developed.   Neck:      Thyroid: No thyromegaly.      Trachea: No tracheal deviation.   Cardiovascular:      Rate and Rhythm: Normal rate and regular rhythm.      Heart sounds: Normal heart sounds.   Pulmonary:      Effort: Pulmonary effort is normal.      Breath sounds: Normal breath sounds.   Chest:   Breasts: Breasts are symmetrical.      Right: No inverted nipple, mass, nipple discharge, skin change or tenderness.      Left: No inverted nipple, mass, nipple discharge, skin change or tenderness.       Abdominal:      Palpations: Abdomen is soft.   Genitourinary:     General: Normal vulva.      Labia:         Right: No rash, tenderness, lesion or injury.         Left: No rash, tenderness, lesion or injury.       Urethra: No prolapse, urethral pain, urethral swelling or urethral lesion.      Vagina: Normal. No signs of injury and foreign body. No vaginal discharge, erythema, tenderness or bleeding.      Cervix: No cervical motion tenderness, discharge or friability.      Uterus: Not deviated, not enlarged, not fixed and not tender.       Adnexa:         Right: No  mass, tenderness or fullness.          Left: No mass, tenderness or fullness.        Rectum: No anal fissure or external hemorrhoid.      Comments: Urethral meatus: normal size, anterior vaginal wall with no prolapse, no lesions  Bladder: no fullness, masses or tenderness  Musculoskeletal:      Cervical back: Normal range of motion and neck supple.   Neurological:      Mental Status: She is alert and oriented to person, place, and time.   Psychiatric:         Behavior: Behavior normal.         Thought Content: Thought content normal.         Judgment: Judgment normal.         Chaperoned by: declined chaperone    ASSESSMENT:       ICD-10-CM ICD-9-CM    1. Well woman exam with routine gynecological exam  Z01.419 V72.31 Liquid-Based Pap Smear, Screening      HPV High Risk Genotypes, PCR   2. Encounter for initial prescription of contraceptive pills  Z30.011 V25.01 norgestimate-ethinyl estradioL (TRI-PREVIFEM, 28,) 0.18/0.215/0.25 mg-35 mcg (28) tablet   3. Saravia syndrome  Z15.09 V84.09           Plan:      Erma was seen today for well woman.    Diagnoses and all orders for this visit:    Well woman exam with routine gynecological exam  -     Liquid-Based Pap Smear, Screening  -     HPV High Risk Genotypes, PCR    Encounter for initial prescription of contraceptive pills  -     norgestimate-ethinyl estradioL (TRI-PREVIFEM, 28,) 0.18/0.215/0.25 mg-35 mcg (28) tablet; Take 1 tablet by mouth once daily.    Saravia syndrome        Orders Placed This Encounter   Procedures    HPV High Risk Genotypes, PCR       Well Woman:  - Pap smear and hpv today  - Birth control: refilled  - GC/CT:n/a  - Mammogram: due age 41  - Smoking cessation: n/a  - Labs: none required   - Vaccines: s/p hpv  - Exercise counseling    Saravia:  - has met with Dr. Zarate, but preferred female; plans to follow up with Dr. Chapman  - Plan for annual visits; consider EMB at age 31; has colonoscopy scheduled, has already met with Breast surgery, s/p TVUS and  Ca 125    Follow up in  one year for annual or prn.    Mandie Marsh

## 2022-03-29 LAB
HPV HR 12 DNA SPEC QL NAA+PROBE: NEGATIVE
HPV16 AG SPEC QL: NEGATIVE
HPV18 DNA SPEC QL NAA+PROBE: NEGATIVE

## 2022-03-31 ENCOUNTER — PATIENT MESSAGE (OUTPATIENT)
Dept: ENDOSCOPY | Facility: HOSPITAL | Age: 27
End: 2022-03-31
Payer: COMMERCIAL

## 2022-03-31 LAB
FINAL PATHOLOGIC DIAGNOSIS: NORMAL
Lab: NORMAL

## 2022-04-01 ENCOUNTER — ANESTHESIA EVENT (OUTPATIENT)
Dept: ENDOSCOPY | Facility: HOSPITAL | Age: 27
End: 2022-04-01
Payer: COMMERCIAL

## 2022-04-01 ENCOUNTER — HOSPITAL ENCOUNTER (OUTPATIENT)
Facility: HOSPITAL | Age: 27
Discharge: HOME | End: 2022-04-01
Attending: INTERNAL MEDICINE | Admitting: INTERNAL MEDICINE
Payer: COMMERCIAL

## 2022-04-01 ENCOUNTER — ANESTHESIA (OUTPATIENT)
Dept: ENDOSCOPY | Facility: HOSPITAL | Age: 27
End: 2022-04-01
Payer: COMMERCIAL

## 2022-04-01 VITALS
BODY MASS INDEX: 26.31 KG/M2 | OXYGEN SATURATION: 95 % | TEMPERATURE: 98 F | DIASTOLIC BLOOD PRESSURE: 76 MMHG | SYSTOLIC BLOOD PRESSURE: 102 MMHG | RESPIRATION RATE: 20 BRPM | HEART RATE: 66 BPM | HEIGHT: 62 IN | WEIGHT: 143 LBS

## 2022-04-01 DIAGNOSIS — Z15.09 LYNCH SYNDROME: Primary | ICD-10-CM

## 2022-04-01 PROCEDURE — 37000009 HC ANESTHESIA EA ADD 15 MINS: Performed by: INTERNAL MEDICINE

## 2022-04-01 PROCEDURE — 37000008 HC ANESTHESIA 1ST 15 MINUTES: Performed by: INTERNAL MEDICINE

## 2022-04-01 PROCEDURE — E9220 PRA ENDO ANESTHESIA: HCPCS | Mod: ,,, | Performed by: NURSE ANESTHETIST, CERTIFIED REGISTERED

## 2022-04-01 PROCEDURE — E9220 PRA ENDO ANESTHESIA: ICD-10-PCS | Mod: ,,, | Performed by: NURSE ANESTHETIST, CERTIFIED REGISTERED

## 2022-04-01 PROCEDURE — 25000003 PHARM REV CODE 250: Performed by: INTERNAL MEDICINE

## 2022-04-01 PROCEDURE — 45378 PR COLONOSCOPY,DIAGNOSTIC: ICD-10-PCS | Mod: ,,, | Performed by: INTERNAL MEDICINE

## 2022-04-01 PROCEDURE — 63600175 PHARM REV CODE 636 W HCPCS: Performed by: NURSE ANESTHETIST, CERTIFIED REGISTERED

## 2022-04-01 PROCEDURE — G0105 COLORECTAL SCRN; HI RISK IND: HCPCS | Performed by: INTERNAL MEDICINE

## 2022-04-01 PROCEDURE — 45378 DIAGNOSTIC COLONOSCOPY: CPT | Mod: ,,, | Performed by: INTERNAL MEDICINE

## 2022-04-01 PROCEDURE — 45378 DIAGNOSTIC COLONOSCOPY: CPT | Performed by: INTERNAL MEDICINE

## 2022-04-01 PROCEDURE — 25000003 PHARM REV CODE 250: Performed by: NURSE ANESTHETIST, CERTIFIED REGISTERED

## 2022-04-01 PROCEDURE — 63600175 PHARM REV CODE 636 W HCPCS: Performed by: ANESTHESIOLOGY

## 2022-04-01 RX ORDER — MIDAZOLAM HYDROCHLORIDE 1 MG/ML
INJECTION, SOLUTION INTRAMUSCULAR; INTRAVENOUS
Status: DISCONTINUED | OUTPATIENT
Start: 2022-04-01 | End: 2022-04-01

## 2022-04-01 RX ORDER — PROPOFOL 10 MG/ML
VIAL (ML) INTRAVENOUS CONTINUOUS PRN
Status: DISCONTINUED | OUTPATIENT
Start: 2022-04-01 | End: 2022-04-01

## 2022-04-01 RX ORDER — SODIUM CHLORIDE 9 MG/ML
INJECTION, SOLUTION INTRAVENOUS CONTINUOUS
Status: DISCONTINUED | OUTPATIENT
Start: 2022-04-01 | End: 2022-04-01 | Stop reason: HOSPADM

## 2022-04-01 RX ORDER — LIDOCAINE HYDROCHLORIDE 20 MG/ML
INJECTION INTRAVENOUS
Status: DISCONTINUED | OUTPATIENT
Start: 2022-04-01 | End: 2022-04-01

## 2022-04-01 RX ORDER — PROPOFOL 10 MG/ML
VIAL (ML) INTRAVENOUS
Status: DISCONTINUED | OUTPATIENT
Start: 2022-04-01 | End: 2022-04-01

## 2022-04-01 RX ORDER — MIDAZOLAM HYDROCHLORIDE 1 MG/ML
1 INJECTION INTRAMUSCULAR; INTRAVENOUS EVERY 10 MIN PRN
Status: DISCONTINUED | OUTPATIENT
Start: 2022-04-01 | End: 2022-04-01 | Stop reason: HOSPADM

## 2022-04-01 RX ADMIN — PROPOFOL 30 MG: 10 INJECTION, EMULSION INTRAVENOUS at 01:04

## 2022-04-01 RX ADMIN — PROPOFOL 50 MG: 10 INJECTION, EMULSION INTRAVENOUS at 01:04

## 2022-04-01 RX ADMIN — MIDAZOLAM 1 MG: 1 INJECTION INTRAMUSCULAR; INTRAVENOUS at 12:04

## 2022-04-01 RX ADMIN — SODIUM CHLORIDE: 0.9 INJECTION, SOLUTION INTRAVENOUS at 12:04

## 2022-04-01 RX ADMIN — MIDAZOLAM HYDROCHLORIDE 1 MG: 1 INJECTION, SOLUTION INTRAMUSCULAR; INTRAVENOUS at 01:04

## 2022-04-01 RX ADMIN — Medication 250 MCG/KG/MIN: at 01:04

## 2022-04-01 RX ADMIN — MIDAZOLAM HYDROCHLORIDE 3 MG: 1 INJECTION, SOLUTION INTRAMUSCULAR; INTRAVENOUS at 01:04

## 2022-04-01 RX ADMIN — LIDOCAINE HYDROCHLORIDE 100 MG: 20 INJECTION, SOLUTION INTRAVENOUS at 01:04

## 2022-04-01 NOTE — H&P
Short Stay Endoscopy History and Physical    PCP - Eulalio Dalal MD    Procedure - Colonoscopy  ASA - per anesthesia  Mallampati - per anesthesia  History of Anesthesia problems - no  Family history Anesthesia problems - no   Plan of anesthesia - General, MAC    HPI:  27 yo F  Colonoscopy for colon cancer screening, black syndrome      ROS:  Constitutional: No fevers, chills, No weight loss  CV: No chest pain  Pulm: No cough, No shortness of breath  GI: see HPI  Derm: No rash    Medical History:  has a past medical history of Acne, Anxiety, Depression, and Black syndrome.    Surgical History:  has a past surgical history that includes [place] tooth extraction.    Family History: family history includes Endometrial cancer in her mother; Ovarian cancer in her mother.. Otherwise no colon cancer, inflammatory bowel disease, or GI malignancies.    Social History:  reports that she has never smoked. She has never used smokeless tobacco. She reports current alcohol use of about 4.0 standard drinks of alcohol per week. She reports that she does not use drugs.    Review of patient's allergies indicates:   Allergen Reactions    Latex, natural rubber Itching and Swelling    Doxycycline      Hives from sun       Medications:   Medications Prior to Admission   Medication Sig Dispense Refill Last Dose    ALPRAZolam (XANAX) 0.25 MG tablet Take 1 tablet (0.25 mg total) by mouth daily as needed for Anxiety (prior to flying). 5 tablet 0     hydrOXYzine HCL (ATARAX) 10 MG Tab Take 1 tablet (10 mg total) by mouth nightly as needed (insomnia). 30 tablet 1     norgestimate-ethinyl estradioL (TRI-PREVIFEM, 28,) 0.18/0.215/0.25 mg-35 mcg (28) tablet Take 1 tablet by mouth once daily. 84 tablet 3     sod sulf-pot chloride-mag sulf (SUTAB) 1.479-0.188- 0.225 gram tablet Take 12 tablets by mouth once daily. Take according to package instructions with indicated amount of water. 24 tablet 0          Physical Exam:    Vital Signs:  There were no vitals filed for this visit.    Gen: NAD, lying comfortably  HENT: NCAT, oropharynx clear  Eyes: anicteric sclerae, EOMI grossly  Neck: supple, no visible masses/goiter  Cardiac: RRR  Lungs: non-labored breathing  Abd: soft, NT/ND, normoactive BS  Ext: no LE edema, warm, well perfused  Skin: skin intact on exposed body parts, no visible rashes, lesions  Neuro: A&Ox4, neuro exam grossly intact, moves all extremities  Psych: appropriate mood, affect        Labs:  Lab Results   Component Value Date    WBC 4.70 01/31/2022    HGB 12.3 01/31/2022    HCT 39.6 01/31/2022     01/31/2022    CHOL 199 01/31/2022    TRIG 68 01/31/2022    HDL 60 01/31/2022    ALT 16 01/31/2022    AST 20 01/31/2022     01/31/2022    K 4.2 01/31/2022     01/31/2022    CREATININE 0.8 01/31/2022    BUN 8 01/31/2022    CO2 22 (L) 01/31/2022    TSH 2.117 01/31/2022    HGBA1C 5.2 01/31/2022       Plan:  Colonoscopy for colon cancer screening, black syndrome    I have explained the risks and benefits of endoscopy procedures to the patient including but not limited to bleeding, perforation, infection, and death.  The patient was asked if they understand and allowed to ask any further questions to their satisfaction.    Zara Barbosa MD

## 2022-04-01 NOTE — ANESTHESIA POSTPROCEDURE EVALUATION
Anesthesia Post Evaluation    Patient: Erma Garland    Procedure(s) Performed: Procedure(s) (LRB):  COLONOSCOPY (N/A)    Final Anesthesia Type: general      Patient location during evaluation: GI PACU  Patient participation: Yes- Able to Participate  Level of consciousness: awake and alert and oriented  Post-procedure vital signs: reviewed and stable  Pain management: adequate  Airway patency: patent    PONV status at discharge: No PONV  Anesthetic complications: no      Cardiovascular status: stable  Respiratory status: unassisted, spontaneous ventilation and room air  Hydration status: euvolemic  Follow-up not needed.          Vitals Value Taken Time   /55 04/01/22 1413   Temp 36.7 °C (98.1 °F) 04/01/22 1359   Pulse 53 04/01/22 1413   Resp 18 04/01/22 1413   SpO2 100 % 04/01/22 1413         No case tracking events are documented in the log.      Pain/Shreyas Score: Shreyas Score: 10 (4/1/2022  2:14 PM)

## 2022-04-01 NOTE — TRANSFER OF CARE
"Anesthesia Transfer of Care Note    Patient: Erma Garland    Procedure(s) Performed: Procedure(s) (LRB):  COLONOSCOPY (N/A)    Patient location: GI    Anesthesia Type: general    Transport from OR: Transported from OR on room air with adequate spontaneous ventilation    Post pain: adequate analgesia    Post assessment: no apparent anesthetic complications and tolerated procedure well    Post vital signs: stable    Level of consciousness: sedated and responds to stimulation    Nausea/Vomiting: no nausea/vomiting    Complications: none    Transfer of care protocol was followed      Last vitals:   Visit Vitals  BP (!) 113/55   Pulse 69   Temp 37.2 °C (99 °F) (Temporal)   Resp 16   Ht 5' 2" (1.575 m)   Wt 64.9 kg (143 lb)   LMP 03/14/2022   SpO2 98%   BMI 26.16 kg/m²     "

## 2022-04-01 NOTE — PROVATION PATIENT INSTRUCTIONS
Discharge Summary/Instructions after an Endoscopic Procedure  Patient Name: Erma Chaparro  Patient MRN: [id number]  Patient YOB: 1995 Friday, April 1, 2022  Zara Barbosa MD  Dear patient,  As a result of recent federal legislation (The Federal Cures Act), you may   receive lab or pathology results from your procedure in your [facility]   account before your physician is able to contact you. Your physician or   their representative will relay the results to you with their   recommendations at their soonest availability.  Thank you,  RESTRICTIONS:  During your procedure today, you received medications for sedation.  These   medications may affect your judgment, balance and coordination.  Therefore,   for 24 hours, you have the following restrictions:   - DO NOT drive a car, operate machinery, make legal/financial decisions,   sign important papers or drink alcohol.    ACTIVITY:  Today: no heavy lifting, straining or running due to procedural   sedation/anesthesia.  The following day: return to full activity including work.  DIET:  Eat and drink normally unless instructed otherwise.     TREATMENT FOR COMMON SIDE EFFECTS:  - Mild abdominal pain, nausea, belching, bloating or excessive gas:  rest,   eat lightly and use a heating pad.  - Sore Throat: treat with throat lozenges and/or gargle with warm salt   water.  - Because air was used during the procedure, expelling large amounts of air   from your rectum or belching is normal.  - If a bowel prep was taken, you may not have a bowel movement for 1-3 days.    This is normal.  SYMPTOMS TO WATCH FOR AND REPORT TO YOUR PHYSICIAN:  1. Abdominal pain or bloating, other than gas cramps.  2. Chest pain.  3. Back pain.  4. Signs of infection such as: chills or fever occurring within 24 hours   after the procedure.  5. Rectal bleeding, which would show as bright red, maroon, or black stools.   (A tablespoon of blood from the rectum is not serious, especially if    hemorrhoids are present.)  6. Vomiting.  7. Weakness or dizziness.  GO DIRECTLY TO THE NEAREST EMERGENCY ROOM IF YOU HAVE ANY OF THE FOLLOWING:      Difficulty breathing              Chills and/or fever over 101 F   Persistent vomiting and/or vomiting blood   Severe abdominal pain   Severe chest pain   Black, tarry stools   Bleeding- more than one tablespoon   Any other symptom or condition that you feel may need urgent attention  Your doctor recommends these additional instructions:  If any biopsies were taken, your doctors clinic will contact you in 1 to 2   weeks with any results.  - Discharge patient to home.   - Resume previous diet.   - Continue present medications.   - Repeat colonoscopy in 1 year for surveillance.  For questions, problems or results please call your physician - Zara Barbosa MD at Work:  [phone].  [facility], EMERGENCY ROOM PHONE NUMBER: [phone]  IF A COMPLICATION OR EMERGENCY SITUATION ARISES AND YOU ARE UNABLE TO REACH   YOUR PHYSICIAN - GO DIRECTLY TO THE EMERGENCY ROOM.  Zara Barbosa MD  4/1/2022 1:56:51 PM  This report has been verified and signed electronically.  Dear patient,  As a result of recent federal legislation (The Federal Cures Act), you may   receive lab or pathology results from your procedure in your [facility]   account before your physician is able to contact you. Your physician or   their representative will relay the results to you with their   recommendations at their soonest availability.  Thank you,  [employer]OVATION

## 2022-04-01 NOTE — ANESTHESIA PREPROCEDURE EVALUATION
04/01/2022  Erma Garland is a 26 y.o., female.  Pre-operative evaluation for Procedure(s) (LRB):  COLONOSCOPY (N/A)    Erma Garland is a 26 y.o. female     Patient Active Problem List   Diagnosis    Acne    Saravia syndrome       Review of patient's allergies indicates:   Allergen Reactions    Latex, natural rubber Itching and Swelling    Doxycycline      Hives from sun       No current facility-administered medications on file prior to encounter.     Current Outpatient Medications on File Prior to Encounter   Medication Sig Dispense Refill    ALPRAZolam (XANAX) 0.25 MG tablet Take 1 tablet (0.25 mg total) by mouth daily as needed for Anxiety (prior to flying). 5 tablet 0       Past Surgical History:   Procedure Laterality Date    WISDOM TOOTH EXTRACTION             Pre-op Assessment    I have reviewed the Patient Summary Reports.     I have reviewed the Nursing Notes. I have reviewed the NPO Status.      Review of Systems  Anesthesia Hx:  No problems with previous Anesthesia    Cardiovascular:  Cardiovascular Normal     Pulmonary:  Pulmonary Normal    Hepatic/GI:  Hepatic/GI Normal    Neurological:  Neurology Normal    Endocrine:  Endocrine Normal    Psych:   anxiety depression          Physical Exam  General: Well nourished    Airway:  Mallampati: I   Mouth Opening: Normal  Tongue: Normal  Neck ROM: Normal ROM    Dental:  Intact        Anesthesia Plan  Type of Anesthesia, risks & benefits discussed:    Anesthesia Type: Gen Natural Airway  Intra-op Monitoring Plan: Standard ASA Monitors  Post Op Pain Control Plan: multimodal analgesia  Induction:  IV  Informed Consent: Informed consent signed with the Patient and all parties understand the risks and agree with anesthesia plan.  All questions answered.   ASA Score: 2  Day of Surgery Review of History & Physical: H&P Update referred to the  surgeon/provider.  Anesthesia Plan Notes: Patient having panic attack upon arrival to pre-procedural area.  Midazolam 1mg IV ordered.    Ready For Surgery From Anesthesia Perspective.     .

## 2022-04-07 ENCOUNTER — PATIENT MESSAGE (OUTPATIENT)
Dept: OBSTETRICS AND GYNECOLOGY | Facility: CLINIC | Age: 27
End: 2022-04-07
Payer: COMMERCIAL

## 2022-04-07 ENCOUNTER — PATIENT MESSAGE (OUTPATIENT)
Dept: INTERNAL MEDICINE | Facility: CLINIC | Age: 27
End: 2022-04-07
Payer: COMMERCIAL

## 2022-04-07 ENCOUNTER — TELEPHONE (OUTPATIENT)
Dept: GENETICS | Facility: CLINIC | Age: 27
End: 2022-04-07
Payer: COMMERCIAL

## 2022-04-07 DIAGNOSIS — F41.8 SITUATIONAL ANXIETY: ICD-10-CM

## 2022-04-07 DIAGNOSIS — F41.1 GAD (GENERALIZED ANXIETY DISORDER): ICD-10-CM

## 2022-04-07 DIAGNOSIS — Z30.41 ENCOUNTER FOR SURVEILLANCE OF CONTRACEPTIVE PILLS: Primary | ICD-10-CM

## 2022-04-07 RX ORDER — ALPRAZOLAM 0.25 MG/1
0.25 TABLET ORAL DAILY PRN
Qty: 5 TABLET | Refills: 0 | Status: SHIPPED | OUTPATIENT
Start: 2022-04-07 | End: 2023-04-04 | Stop reason: SDUPTHER

## 2022-04-07 NOTE — TELEPHONE ENCOUNTER
LVM for pt in reference to a referral for Saravia syndrome [Z15.09] pt was also told to give us a call back in clinic when given a chance.

## 2022-04-08 ENCOUNTER — PATIENT MESSAGE (OUTPATIENT)
Dept: INTERNAL MEDICINE | Facility: CLINIC | Age: 27
End: 2022-04-08
Payer: COMMERCIAL

## 2022-04-08 ENCOUNTER — PATIENT MESSAGE (OUTPATIENT)
Dept: OBSTETRICS AND GYNECOLOGY | Facility: CLINIC | Age: 27
End: 2022-04-08
Payer: COMMERCIAL

## 2022-04-08 DIAGNOSIS — G47.9 SLEEPING DIFFICULTIES: ICD-10-CM

## 2022-04-08 DIAGNOSIS — F41.1 GAD (GENERALIZED ANXIETY DISORDER): Primary | ICD-10-CM

## 2022-04-08 DIAGNOSIS — F32.A DEPRESSION, UNSPECIFIED DEPRESSION TYPE: ICD-10-CM

## 2022-06-28 ENCOUNTER — PATIENT MESSAGE (OUTPATIENT)
Dept: OBSTETRICS AND GYNECOLOGY | Facility: CLINIC | Age: 27
End: 2022-06-28
Payer: COMMERCIAL

## 2022-06-28 DIAGNOSIS — Z97.5 CONTRACEPTION, DEVICE INTRAUTERINE: Primary | ICD-10-CM

## 2022-07-01 ENCOUNTER — PATIENT MESSAGE (OUTPATIENT)
Dept: GYNECOLOGIC ONCOLOGY | Facility: CLINIC | Age: 27
End: 2022-07-01
Payer: COMMERCIAL

## 2023-03-05 ENCOUNTER — PATIENT MESSAGE (OUTPATIENT)
Dept: GYNECOLOGIC ONCOLOGY | Facility: CLINIC | Age: 28
End: 2023-03-05
Payer: COMMERCIAL

## 2023-03-07 ENCOUNTER — PATIENT MESSAGE (OUTPATIENT)
Dept: GYNECOLOGIC ONCOLOGY | Facility: CLINIC | Age: 28
End: 2023-03-07
Payer: COMMERCIAL

## 2023-03-24 ENCOUNTER — PATIENT MESSAGE (OUTPATIENT)
Dept: GYNECOLOGIC ONCOLOGY | Facility: CLINIC | Age: 28
End: 2023-03-24
Payer: COMMERCIAL

## 2023-03-30 ENCOUNTER — OFFICE VISIT (OUTPATIENT)
Dept: GYNECOLOGIC ONCOLOGY | Facility: CLINIC | Age: 28
End: 2023-03-30
Payer: COMMERCIAL

## 2023-03-30 VITALS
HEIGHT: 62 IN | SYSTOLIC BLOOD PRESSURE: 102 MMHG | BODY MASS INDEX: 26.13 KG/M2 | HEART RATE: 89 BPM | WEIGHT: 142 LBS | DIASTOLIC BLOOD PRESSURE: 54 MMHG

## 2023-03-30 DIAGNOSIS — Z15.09 LYNCH SYNDROME: Primary | ICD-10-CM

## 2023-03-30 PROCEDURE — 99999 PR PBB SHADOW E&M-EST. PATIENT-LVL III: CPT | Mod: PBBFAC,,, | Performed by: OBSTETRICS & GYNECOLOGY

## 2023-03-30 PROCEDURE — 3074F PR MOST RECENT SYSTOLIC BLOOD PRESSURE < 130 MM HG: ICD-10-PCS | Mod: CPTII,S$GLB,, | Performed by: OBSTETRICS & GYNECOLOGY

## 2023-03-30 PROCEDURE — 3078F DIAST BP <80 MM HG: CPT | Mod: CPTII,S$GLB,, | Performed by: OBSTETRICS & GYNECOLOGY

## 2023-03-30 PROCEDURE — 99999 PR PBB SHADOW E&M-EST. PATIENT-LVL III: ICD-10-PCS | Mod: PBBFAC,,, | Performed by: OBSTETRICS & GYNECOLOGY

## 2023-03-30 PROCEDURE — 3008F PR BODY MASS INDEX (BMI) DOCUMENTED: ICD-10-PCS | Mod: CPTII,S$GLB,, | Performed by: OBSTETRICS & GYNECOLOGY

## 2023-03-30 PROCEDURE — 3008F BODY MASS INDEX DOCD: CPT | Mod: CPTII,S$GLB,, | Performed by: OBSTETRICS & GYNECOLOGY

## 2023-03-30 PROCEDURE — 3074F SYST BP LT 130 MM HG: CPT | Mod: CPTII,S$GLB,, | Performed by: OBSTETRICS & GYNECOLOGY

## 2023-03-30 PROCEDURE — 99214 PR OFFICE/OUTPT VISIT, EST, LEVL IV, 30-39 MIN: ICD-10-PCS | Mod: S$GLB,,, | Performed by: OBSTETRICS & GYNECOLOGY

## 2023-03-30 PROCEDURE — 99214 OFFICE O/P EST MOD 30 MIN: CPT | Mod: S$GLB,,, | Performed by: OBSTETRICS & GYNECOLOGY

## 2023-03-30 PROCEDURE — 3078F PR MOST RECENT DIASTOLIC BLOOD PRESSURE < 80 MM HG: ICD-10-PCS | Mod: CPTII,S$GLB,, | Performed by: OBSTETRICS & GYNECOLOGY

## 2023-03-30 NOTE — PROGRESS NOTES
Subjective:      Patient ID: Erma Garland is a 27 y.o. female.    Chief Complaint:  Saravia Syndrome   HPI  Erma Garland is a 27 y.o. 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  Patient's mother had endometrial cancer, and this prompted the testing.  Denies PSH.  LMP unknown due to continuous OCP's.  Mother was in her 40's when diagnosed with endometrial.  No breast or other gyn cancers.  No colon cancer.       Review of Systems   Constitutional:  Negative for activity change, appetite change, chills, fatigue and fever.   HENT:  Negative for hearing loss, mouth sores, nosebleeds, sore throat and tinnitus.    Eyes:  Negative for visual disturbance.   Respiratory:  Negative for cough, chest tightness, shortness of breath and wheezing.    Cardiovascular:  Negative for chest pain and leg swelling.   Gastrointestinal:  Negative for abdominal distention, abdominal pain, blood in stool, constipation, diarrhea, nausea and vomiting.   Genitourinary:  Negative for dysuria, flank pain, frequency, hematuria, pelvic pain, vaginal bleeding, vaginal discharge and vaginal pain.   Musculoskeletal:  Negative for arthralgias and back pain.   Skin:  Negative for rash.   Neurological:  Negative for dizziness, seizures, syncope, weakness and numbness.   Hematological:  Does not bruise/bleed easily.   Psychiatric/Behavioral:  Negative for confusion and sleep disturbance. The patient is not nervous/anxious.      Past Medical History:   Diagnosis Date    Acne     Anxiety     Depression     Saravia syndrome      Past Surgical History:   Procedure Laterality Date    COLONOSCOPY N/A 4/1/2022    Procedure: COLONOSCOPY;  Surgeon: Zara Barbosa MD;  Location: [address];  Service: Endoscopy;  Laterality: N/A;  fully vaccinated    WISDOM TOOTH EXTRACTION       Family History   Problem Relation Age of Onset    Ovarian cancer Mother         s/p hys    Endometrial cancer Mother      Melanoma Neg Hx      Social History     Socioeconomic History    Marital status: Single    Number of children: 0   Occupational History    Occupation: student     Comment: [employer]   Tobacco Use    Smoking status: Never    Smokeless tobacco: Never   Substance and Sexual Activity    Alcohol use: Yes     Alcohol/week: 4.0 standard drinks     Types: 1 Glasses of wine, 1 Cans of beer, 2 Shots of liquor per week     Comment: 3-5 drinks weekly     Drug use: No    Sexual activity: Never   Other Topics Concern    Are you pregnant or think you may be? No    Breast-feeding No   Social History Narrative    Does yoga daily. Likes to [employer].         Works as [email or web address].      Current Outpatient Medications   Medication Sig    ALPRAZolam (XANAX) 0.25 MG tablet Take 1 tablet (0.25 mg total) by mouth daily as needed for Anxiety (prior to flying).    hydrOXYzine HCL (ATARAX) 10 MG Tab TAKE 1 TABLET BY MOUTH NIGHTLY AS NEEDED (INSOMNIA).    norethindrone-ethinyl estradiol (NYLIA 1/35, 28,) 1-35 mg-mcg per tablet TAKE 1 TABLET BY MOUTH EVERY DAY    norethindrone-ethinyl estradiol (ORTHO-NOVUM 7/7/7, 28,) 0.5/0.75/1 mg- 35 mcg per tablet Take 1 tablet by mouth once daily.    sod sulf-pot chloride-mag sulf (SUTAB) 1.479-0.188- 0.225 gram tablet Take 12 tablets by mouth once daily. Take according to package instructions with indicated amount of water.     No current facility-administered medications for this visit.     Review of patient's allergies indicates:   Allergen Reactions    Latex, natural rubber Itching and Swelling    Doxycycline      Hives from sun       Objective:   Physical Exam:   Constitutional: She is oriented to person, place, and time. She appears well-developed and well-nourished.       Cardiovascular:  Normal rate.             Pulmonary/Chest: Effort normal.                      Neurological: She is alert and oriented to person, place, and time.     Psychiatric: She has a normal mood and affect. Her  behavior is normal.   Deferred pelvic today as she will have it next week at annual gyn exam     Assessment:     1. Saravia syndrome      Plan:       - We discussed her diagnosis of Saravia Syndrome. The estimated endometrial cancer risk by age 40 years in women with Saravia syndrome is approximately 2-4%, and the estimated ovarian cancer risk is approximately 1-2%; by age 50 years, this risk increases to 8-17% and 3-7%, respectively , for this reason RR Hyst/BSO is recommended age 40-45.  - Gyn Screening recommendations until definitive surgery include:        - Endometrial biopsy every 1-2 years, beginning at age 30       - Keeping a menstrual calendar and evaluating abnormal uterine bleeding.    - Available screening strategies have a limited ability to detect ovarian cancer at an early, more curable stage of disease, and there is no evidence that screening has reduced the mortality or improved the survival associated with ovarian cancer in high-risk populations.  Nevertheless,  screening is reasonable with TV US and  yearly if she would like it. She declines.   - For  now, she can continue routine gynecologic care with her established OBGYN with annual pelvic exam and pap smears per guidelines. She can see me yearly to check in. I encouraged her to explore [employer]CE website for support. She was given the opportunity to ask questions, and stated all had been answered.       Kalyani Chapman MD  Gynecologic Oncology

## 2023-04-04 ENCOUNTER — OFFICE VISIT (OUTPATIENT)
Dept: OBSTETRICS AND GYNECOLOGY | Facility: CLINIC | Age: 28
End: 2023-04-04
Payer: COMMERCIAL

## 2023-04-04 VITALS
HEIGHT: 62 IN | BODY MASS INDEX: 26.49 KG/M2 | DIASTOLIC BLOOD PRESSURE: 67 MMHG | HEART RATE: 61 BPM | WEIGHT: 143.94 LBS | SYSTOLIC BLOOD PRESSURE: 110 MMHG

## 2023-04-04 DIAGNOSIS — F41.8 SITUATIONAL ANXIETY: ICD-10-CM

## 2023-04-04 DIAGNOSIS — Z01.419 WELL WOMAN EXAM WITH ROUTINE GYNECOLOGICAL EXAM: Primary | ICD-10-CM

## 2023-04-04 DIAGNOSIS — Z30.41 ENCOUNTER FOR SURVEILLANCE OF CONTRACEPTIVE PILLS: ICD-10-CM

## 2023-04-04 DIAGNOSIS — F41.1 GAD (GENERALIZED ANXIETY DISORDER): ICD-10-CM

## 2023-04-04 PROCEDURE — 3008F BODY MASS INDEX DOCD: CPT | Mod: CPTII,S$GLB,, | Performed by: OBSTETRICS & GYNECOLOGY

## 2023-04-04 PROCEDURE — 3078F PR MOST RECENT DIASTOLIC BLOOD PRESSURE < 80 MM HG: ICD-10-PCS | Mod: CPTII,S$GLB,, | Performed by: OBSTETRICS & GYNECOLOGY

## 2023-04-04 PROCEDURE — 99395 PREV VISIT EST AGE 18-39: CPT | Mod: S$GLB,,, | Performed by: OBSTETRICS & GYNECOLOGY

## 2023-04-04 PROCEDURE — 99999 PR PBB SHADOW E&M-EST. PATIENT-LVL III: ICD-10-PCS | Mod: PBBFAC,,, | Performed by: OBSTETRICS & GYNECOLOGY

## 2023-04-04 PROCEDURE — 1159F MED LIST DOCD IN RCRD: CPT | Mod: CPTII,S$GLB,, | Performed by: OBSTETRICS & GYNECOLOGY

## 2023-04-04 PROCEDURE — 3008F PR BODY MASS INDEX (BMI) DOCUMENTED: ICD-10-PCS | Mod: CPTII,S$GLB,, | Performed by: OBSTETRICS & GYNECOLOGY

## 2023-04-04 PROCEDURE — 3074F SYST BP LT 130 MM HG: CPT | Mod: CPTII,S$GLB,, | Performed by: OBSTETRICS & GYNECOLOGY

## 2023-04-04 PROCEDURE — 99999 PR PBB SHADOW E&M-EST. PATIENT-LVL III: CPT | Mod: PBBFAC,,, | Performed by: OBSTETRICS & GYNECOLOGY

## 2023-04-04 PROCEDURE — 3078F DIAST BP <80 MM HG: CPT | Mod: CPTII,S$GLB,, | Performed by: OBSTETRICS & GYNECOLOGY

## 2023-04-04 PROCEDURE — 3074F PR MOST RECENT SYSTOLIC BLOOD PRESSURE < 130 MM HG: ICD-10-PCS | Mod: CPTII,S$GLB,, | Performed by: OBSTETRICS & GYNECOLOGY

## 2023-04-04 PROCEDURE — 99395 PR PREVENTIVE VISIT,EST,18-39: ICD-10-PCS | Mod: S$GLB,,, | Performed by: OBSTETRICS & GYNECOLOGY

## 2023-04-04 PROCEDURE — 1159F PR MEDICATION LIST DOCUMENTED IN MEDICAL RECORD: ICD-10-PCS | Mod: CPTII,S$GLB,, | Performed by: OBSTETRICS & GYNECOLOGY

## 2023-04-04 RX ORDER — ALPRAZOLAM 0.25 MG/1
0.25 TABLET ORAL DAILY PRN
Qty: 5 TABLET | Refills: 0 | Status: SHIPPED | OUTPATIENT
Start: 2023-04-04 | End: 2023-08-02

## 2023-04-04 NOTE — PROGRESS NOTES
History & Physical  Gynecology      SUBJECTIVE:     Chief Complaint: Well Woman       History of Present Illness:  Annual Exam-Premenopausal  Patient presents for annual exam. The patient has no complaints today. Menstrual cycles are monthly.  The patient is sexually active. GYN screening history: last pap: approximate date 3/23/2022 neg paphpv  and was normal. The patient participates in regular exercise: yes.  Smoking status:  no cigarettes.    Contraception: OCP    FH:  Breast cancer: neg  Colon cancer: neg  Ovarian and Uterine cancer: mother, MSH2 mutation- Saravia    Review of patient's allergies indicates:   Allergen Reactions    Latex, natural rubber Itching and Swelling    Doxycycline      Hives from sun       Past Medical History:   Diagnosis Date    Acne     Anxiety     Depression     Saravia syndrome      Past Surgical History:   Procedure Laterality Date    COLONOSCOPY N/A 2022    Procedure: COLONOSCOPY;  Surgeon: Zara Barbosa MD;  Location: [address];  Service: Endoscopy;  Laterality: N/A;  fully vaccinated    WISDOM TOOTH EXTRACTION       OB History          0    Para   0    Term   0       0    AB   0    Living   0         SAB   0    IAB   0    Ectopic   0    Multiple   0    Live Births   0               Family History   Problem Relation Age of Onset    Ovarian cancer Mother         s/p hys    Endometrial cancer Mother     Melanoma Neg Hx     Breast cancer Neg Hx     Colon cancer Neg Hx      Social History     Tobacco Use    Smoking status: Never    Smokeless tobacco: Never   Substance Use Topics    Alcohol use: Yes     Alcohol/week: 4.0 standard drinks     Types: 1 Glasses of wine, 1 Cans of beer, 2 Shots of liquor per week     Comment: 3-5 drinks weekly     Drug use: No       Current Outpatient Medications   Medication Sig    ALPRAZolam (XANAX) 0.25 MG tablet Take 1 tablet (0.25 mg total) by mouth daily as needed for Anxiety (prior to flying).    hydrOXYzine HCL (ATARAX) 10 MG  Tab TAKE 1 TABLET BY MOUTH NIGHTLY AS NEEDED (INSOMNIA).    norethindrone-ethinyl estradiol (ORTHO-NOVUM 7/7/7, 28,) 0.5/0.75/1 mg- 35 mcg per tablet Take 1 tablet by mouth once daily.    sod sulf-pot chloride-mag sulf (SUTAB) 1.479-0.188- 0.225 gram tablet Take 12 tablets by mouth once daily. Take according to package instructions with indicated amount of water.     No current facility-administered medications for this visit.         Review of Systems:  Review of Systems   Constitutional:  Negative for appetite change, fever and unexpected weight change.   Respiratory:  Negative for shortness of breath.    Cardiovascular:  Negative for chest pain.   Gastrointestinal:  Negative for nausea and vomiting.   Genitourinary:  Negative for menorrhagia, menstrual problem, pelvic pain, vaginal bleeding, vaginal discharge and vaginal pain.   Integumentary:  Negative for breast mass.   Breast: Negative for lump and mass     OBJECTIVE:     Physical Exam:  Physical Exam  Vitals and nursing note reviewed.   Constitutional:       Appearance: She is well-developed.   Neck:      Thyroid: No thyromegaly.      Trachea: No tracheal deviation.   Cardiovascular:      Rate and Rhythm: Normal rate and regular rhythm.      Heart sounds: Normal heart sounds.   Pulmonary:      Effort: Pulmonary effort is normal.      Breath sounds: Normal breath sounds.   Chest:   Breasts:     Breasts are symmetrical.      Right: No inverted nipple, mass, nipple discharge, skin change or tenderness.      Left: No inverted nipple, mass, nipple discharge, skin change or tenderness.   Abdominal:      Palpations: Abdomen is soft.   Genitourinary:     General: Normal vulva.      Labia:         Right: No rash, tenderness, lesion or injury.         Left: No rash, tenderness, lesion or injury.       Urethra: No prolapse, urethral pain, urethral swelling or urethral lesion.      Vagina: Normal. No signs of injury and foreign body. No vaginal discharge, erythema,  tenderness or bleeding.      Cervix: No cervical motion tenderness, discharge or friability.      Uterus: Not deviated, not enlarged, not fixed and not tender.       Adnexa:         Right: No mass, tenderness or fullness.          Left: No mass, tenderness or fullness.        Rectum: No anal fissure or external hemorrhoid.      Comments: Urethral meatus: normal size, anterior vaginal wall with no prolapse, no lesions  Bladder: no fullness, masses or tenderness  Musculoskeletal:      Cervical back: Normal range of motion and neck supple.   Neurological:      Mental Status: She is alert and oriented to person, place, and time.   Psychiatric:         Behavior: Behavior normal.         Thought Content: Thought content normal.         Judgment: Judgment normal.       Chaperoned by: declined chaperone    ASSESSMENT:       ICD-10-CM ICD-9-CM    1. Well woman exam with routine gynecological exam  Z01.419 V72.31       2. Encounter for surveillance of contraceptive pills  Z30.41 V25.41 norethindrone-ethinyl estradiol (ORTHO-NOVUM 7/7/7, 28,) 0.5/0.75/1 mg- 35 mcg per tablet      3. KALEIGH (generalized anxiety disorder)  F41.1 300.02 ALPRAZolam (XANAX) 0.25 MG tablet      4. Situational anxiety  F41.8 300.09 ALPRAZolam (XANAX) 0.25 MG tablet               Plan:      Erma was seen today for well woman.    Diagnoses and all orders for this visit:    Well woman exam with routine gynecological exam    Encounter for surveillance of contraceptive pills  -     norethindrone-ethinyl estradiol (ORTHO-NOVUM 7/7/7, 28,) 0.5/0.75/1 mg- 35 mcg per tablet; Take 1 tablet by mouth once daily.    KALEIGH (generalized anxiety disorder)  -     ALPRAZolam (XANAX) 0.25 MG tablet; Take 1 tablet (0.25 mg total) by mouth daily as needed for Anxiety (prior to flying).    Situational anxiety  -     ALPRAZolam (XANAX) 0.25 MG tablet; Take 1 tablet (0.25 mg total) by mouth daily as needed for Anxiety (prior to flying).          No orders of the defined types  were placed in this encounter.      Well Woman:  - Pap smear up to date  - Birth control: refilled- long discussion about IUD; patient has anxiety about placement; consider placement with next colonoscopy; will stick with OCP for now  - GC/CT:n/a  - Mammogram: due age 40  - Smoking cessation: n/a  - Labs: none required   - Vaccines: s/p hpv      Saravia:  - Plan for annual visits; consider EMB at age 30; has had first colonoscopy, has already met with Breast surgery, s/p TVUS and Ca 125;     Dr. 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 The estimated endometrial cancer risk by age 40 years in women with Saravia syndrome is approximately 2-4%, and the estimated ovarian cancer risk is approximately 1-2%; by age 50 years, this risk increases to 8-17% and 3-7%, respectively , for this reason RR Hyst/BSO is recommended age 40-45.  - Gyn Screening recommendations until definitive surgery include:        - Endometrial biopsy every 1-2 years, beginning at age 30       - Keeping a menstrual calendar and evaluating abnormal uterine bleeding.    - Available screening strategies have a limited ability to detect ovarian cancer at an early, more curable stage of disease, and there is no evidence that screening has reduced the mortality or improved the survival associated with ovarian cancer in high-risk populations.  Nevertheless,  screening is reasonable with TV US and  yearly if she would like it. She declines.   - For  now, she can continue routine gynecologic care with her established OBGYN with annual pelvic exam and pap smears per guidelines. She can see me yearly to check in. I encouraged her to explore [employer] website for support. She was given the opportunity to ask questions, and stated all had been answered.    Follow up in  one year for annual or prn.    Mandie Marsh

## 2023-04-08 ENCOUNTER — PATIENT MESSAGE (OUTPATIENT)
Dept: OBSTETRICS AND GYNECOLOGY | Facility: CLINIC | Age: 28
End: 2023-04-08
Payer: COMMERCIAL

## 2023-04-08 DIAGNOSIS — Z30.41 ENCOUNTER FOR SURVEILLANCE OF CONTRACEPTIVE PILLS: ICD-10-CM

## 2023-04-13 ENCOUNTER — TELEPHONE (OUTPATIENT)
Dept: OBSTETRICS AND GYNECOLOGY | Facility: CLINIC | Age: 28
End: 2023-04-13
Payer: COMMERCIAL

## 2023-04-13 NOTE — TELEPHONE ENCOUNTER
Called and approved dosage change per Dr. Marsh. Pharmacy VU and call was ended    ----- Message from Izzy Scott sent at 4/13/2023  1:00 PM CDT -----  Regarding: Pharmacy  call  Contact: [facility]  Type:  Pharmacy Calling to Clarify an RX    Name of Caller:rep prashanth [facility]      [facility]armacy Name[facility]      Prescription Name:ALPRAZolam (XANAX) 0.25 MG tablet   What do they need to clarify?:she states the 0.25 is on a shortage , she states they  only have the have 0.5 avail    Best Call Back Number:[phone]  Additional Information:

## 2023-06-14 ENCOUNTER — PATIENT MESSAGE (OUTPATIENT)
Dept: OBSTETRICS AND GYNECOLOGY | Facility: CLINIC | Age: 28
End: 2023-06-14
Payer: COMMERCIAL

## 2023-08-01 NOTE — PROGRESS NOTES
FAMILY MEDICINE  [facility]    Reason for visit:   Chief Complaint   Patient presents with    [facility]    Annual Exam         SUBJECTIVE: Erma Garland is a 28 y.o. female  - with Saravia syndrome (MSH2 genetic mutation)  presents as a new patient to [facility] and for her routine annual physical.  Last PCP: Dr. Eulalio Dalal MD    Gastroenterology:  Dr. Zara Barbosa MD  Gynecology: Dr.Marguerite GOPI Marsh MD    Erma Khalilpo reports that she would like her annual wellness but also does have concerns for anxiety.     She denies any new cancers in her family and we reviewed her family history. Her last colonoscopy was 4/2022    1. Anxiety    Today 8/2/23:  Erma Garland reports a history of anxiety and reports that she has been medication the past.  She reports that she tries to manage without medication.  She is having issues with sleep recently.  She reports that her last PCP prescribed hydroxyzine 10 mg at bedtime but she does not feel that it is effective.  She also feels that is not helpful during the day.  She has been on rest pain past.  She tries to limit her lorazepam for flights only.  She has had severe panic attacks in the past.  She reports was notably she had a panic attack prior to her colonoscopy last year.  She is interested in starting medication for anxiety but she would like to avoid a daily medication at this time.  She reports it does not seem to be a daily then.  She would like something that is as needed.  She would like to note there any alternatives to benzodiazepine or hydroxyzine for as needed anxiety management.    Prior notes: NA    Panic attacks: yes  Hopelessness:  denies  Sleep issues:  yes  Suicidal thoughts:  denies  Thoughts of self harm: denies  Thoughts of harm to others:  denies  History of suicide attempts:  denies  Family history of suicide: denies    Psychiatrist: none  Psychologist: none  Counselor : none    Prior  medication: hydroxyzine (not effective); fluoxetine ([id number]), lorazepam,     Current medications:   Lorazepam as needed for flying only    Side effects of current treatment: NA    Support system: family and partner                  Review of Systems   All other systems reviewed and are negative.      HEALTH MAINTENANCE:   Health Maintenance   Topic Date Due    Hepatitis C Screening  Never done    Pap Smear  03/23/2025    TETANUS VACCINE  08/02/2033    Lipid Panel  Completed     Health Maintenance Topics with due status: Not Due       Topic Last Completion Date    Pap Smear 03/23/2022    Influenza Vaccine 10/26/2022    TETANUS VACCINE 08/02/2023     Health Maintenance Due   Topic Date Due    Hepatitis C Screening  Never done    HIV Screening  Never done       HISTORY:   Past Medical History:   Diagnosis Date    Acne     Anxiety     Depression     Saravia syndrome        Past Surgical History:   Procedure Laterality Date    COLON SURGERY  April 2022    Colonoscopy    COLONOSCOPY N/A 04/01/2022    Procedure: COLONOSCOPY;  Surgeon: Zara Barbosa MD;  Location: [address];  Service: Endoscopy;  Laterality: N/A;  fully vaccinated    WISDOM TOOTH EXTRACTION         Family History   Problem Relation Age of Onset    Ovarian cancer Mother         s/p hys    Endometrial cancer Mother     Cancer Mother     Depression Mother     Mental illness Mother     Heart disease Father         unknown specific    No Known Problems Sister     Cancer Maternal Aunt         thyroid cancer    Cancer Maternal Grandfather         brain cancer    Early death Maternal Grandfather     Cancer Paternal Grandmother         unknown    Melanoma Neg Hx     Breast cancer Neg Hx     Colon cancer Neg Hx        Social History     Tobacco Use    Smoking status: Never    Smokeless tobacco: Never   Substance Use Topics    Alcohol use: Yes     Alcohol/week: 6.0 standard drinks of alcohol     Types: 2 Glasses of wine, 4 Drinks containing 0.5 oz of alcohol  "per week     Comment: 3-5 drinks weekly     Drug use: No       Social History     Social History Narrative    Single and lives with partner WENDY since . They have 2 cats. She is allergic but loves cats. She is from [place] and moved to [facility] .     Does yoga daily. Likes to [employer].     Works as freelance filmmaker.        ALLERGIES:   Review of patient's allergies indicates:   Allergen Reactions    Latex, natural rubber Itching and Swelling    Doxycycline      Hives from sun    Cat dander Itching     rhinitis       MEDS:   Current Outpatient Medications on File Prior to Visit   Medication Sig Dispense Refill Last Dose    LORazepam (ATIVAN) 1 MG tablet Take 1 mg by mouth.   Taking    norethindrone-ethinyl estradiol (ORTHO-NOVUM /, 28,) 0.5/0.75/1 mg- 35 mcg per tablet Take 1 tablet by mouth once daily. 84 tablet 3 Taking    [DISCONTINUED] ALPRAZolam (XANAX) 0.25 MG tablet Take 1 tablet (0.25 mg total) by mouth daily as needed for Anxiety (prior to flying). (Patient not taking: Reported on 2023) 5 tablet 0 Not Taking    [DISCONTINUED] erythromycin (ROMYCIN) ophthalmic ointment SMARTSI sparingly Left Eye 3 Times Daily       [DISCONTINUED] hydrOXYzine HCL (ATARAX) 10 MG Tab TAKE 1 TABLET BY MOUTH NIGHTLY AS NEEDED (INSOMNIA). (Patient not taking: Reported on 2023) 30 tablet 5 Not Taking    [DISCONTINUED] sod sulf-pot chloride-mag sulf (SUTAB) 1.479-0.188- 0.225 gram tablet Take 12 tablets by mouth once daily. Take according to package instructions with indicated amount of water. (Patient not taking: Reported on 2023) 24 tablet 0 Not Taking         Vital signs:   Vitals:    23 0954   BP: (!) 112/52   Pulse: 63   SpO2: 100%   Weight: 66.4 kg (146 lb 7.9 oz)   Height: 5' 2" (1.575 m)     Body mass index is 26.79 kg/m².    PHYSICAL EXAM:     Physical Exam  Vitals reviewed.   Constitutional:       General: She is not in acute distress.  HENT:      Head: Normocephalic and atraumatic.     " " Right Ear: Tympanic membrane and ear canal normal.      Left Ear: Tympanic membrane and ear canal normal.   Eyes:      General: No scleral icterus.     Conjunctiva/sclera: Conjunctivae normal.   Neck:      Thyroid: No thyromegaly.      Vascular: No carotid bruit.   Cardiovascular:      Rate and Rhythm: Normal rate and regular rhythm.      Heart sounds: Normal heart sounds. No murmur heard.     No friction rub. No gallop.   Pulmonary:      Effort: Pulmonary effort is normal.      Breath sounds: Normal breath sounds. No wheezing or rales.   Abdominal:      General: Bowel sounds are normal. There is no distension.      Palpations: Abdomen is soft.      Tenderness: There is no abdominal tenderness.   Musculoskeletal:      Cervical back: Normal range of motion and neck supple.      Right lower leg: No edema.      Left lower leg: No edema.   Lymphadenopathy:      Cervical: No cervical adenopathy.   Skin:     General: Skin is warm.      Capillary Refill: Capillary refill takes less than 2 seconds.   Neurological:      Mental Status: She is alert.   Psychiatric:         Mood and Affect: Affect normal.      Comments: Mood "anxious"             PERTINENT RESULTS:   No visits with results within 1 Week(s) from this visit.   Latest known visit with results is:   Office Visit on 03/23/2022   Component Date Value Ref Range Status    Final Pathologic Diagnosis 03/23/2022    Final                    Value:Specimen Adequacy  Satisfactory for interpretation. Endocervical component is present.  Walker Category  Negative for intraepithelial lesion or malignancy.  Inflammation present.      Interp By Kaitlin Hess, Signed on 03/31/2022 at 19:47    Disclaimer 03/23/2022    Final                    Value:The Pap smear is a screening test that aids in the detection of cervical  cancer and cancer precursors. Both false positive and false negative results  can occur. The test should be used at regular intervals, and positive " results  should be confirmed before definitive therapy.  This liquid based specimen is processed using the  or  Thin PrepPAP  System. This specimen has been analyzed by the ThinPrep Imaging System  ("[employer]), an automated imaging and review system which assists  the laboratory in evaluating cells on ThinPrep PAP tests. Following automated  imaging, selected fields from every slide are reviewed by a cytotechnologist  and/or pathologist.  Screening was performed at [facility], [address] [place] [address].      HPV other High Risk types, PCR 03/23/2022 Negative  Negative Final    Comment: Other HPV genotypes include:   31,33,35,39,45,51,52,56,58,59,66 and 68.      HPV High Risk type 16, PCR 03/23/2022 Negative  Negative Final    HPV High Risk type 18, PCR 03/23/2022 Negative  Negative Final     Lab Results   Component Value Date    WBC 4.70 01/31/2022    HGB 12.3 01/31/2022    HCT 39.6 01/31/2022    MCV 91 01/31/2022     01/31/2022       CMP  Sodium   Date Value Ref Range Status   01/31/2022 137 136 - 145 mmol/L Final     Potassium   Date Value Ref Range Status   01/31/2022 4.2 3.5 - 5.1 mmol/L Final     Chloride   Date Value Ref Range Status   01/31/2022 106 95 - 110 mmol/L Final     CO2   Date Value Ref Range Status   01/31/2022 22 (L) 23 - 29 mmol/L Final     Glucose   Date Value Ref Range Status   01/31/2022 57 (L) 70 - 110 mg/dL Final     BUN   Date Value Ref Range Status   01/31/2022 8 6 - 20 mg/dL Final     Creatinine   Date Value Ref Range Status   01/31/2022 0.8 0.5 - 1.4 mg/dL Final     Calcium   Date Value Ref Range Status   01/31/2022 9.3 8.7 - 10.5 mg/dL Final     Total Protein   Date Value Ref Range Status   01/31/2022 7.5 6.0 - 8.4 g/dL Final     Albumin   Date Value Ref Range Status   01/31/2022 4.0 3.5 - 5.2 g/dL Final     Total Bilirubin   Date Value Ref Range Status   01/31/2022 0.3 0.1 - 1.0 mg/dL Final      "Comment:     For infants and newborns, interpretation of results should be based  on gestational age, weight and in agreement with clinical  observations.    Premature Infant recommended reference ranges:  Up to 24 hours.............<8.0 mg/dL  Up to 48 hours............<12.0 mg/dL  3-5 days..................<15.0 mg/dL  6-29 days.................<15.0 mg/dL       Alkaline Phosphatase   Date Value Ref Range Status   01/31/2022 34 (L) 55 - 135 U/L Final     AST   Date Value Ref Range Status   01/31/2022 20 10 - 40 U/L Final     ALT   Date Value Ref Range Status   01/31/2022 16 10 - 44 U/L Final     Anion Gap   Date Value Ref Range Status   01/31/2022 9 8 - 16 mmol/L Final     Lab Results   Component Value Date    CHOL 199 01/31/2022     Lab Results   Component Value Date    HDL 60 01/31/2022     Lab Results   Component Value Date    LDLCALC 125.4 01/31/2022     No results found for: "DLDL"  Lab Results   Component Value Date    TRIG 68 01/31/2022       f1   Lab Results   Component Value Date    CHOLHDL 30.2 01/31/2022     Patient Name: Erma Chaparro   Procedure Date: 4/1/2022 1:35 PM   MRN: [id number]   Account Number: [id number]   YOB: 1995   Age: 26   Room: [address] [place] [address]   Gender: Female   Attending MD: Zara Barbosa MD   Procedure:             Colonoscopy   Indications:           Saravia Syndrome   Providers:             Zara Barbosa MD, Francheska Sutherland RN, Adelita Everett, Technician, Key Ball CRNA   Referring MD:          Zara Barbosa MD   Complications:         No immediate complications.   Medicines:             See the Anesthesia note for documentation of the                          administered medications   Procedure:             Pre-Anesthesia Assessment:                          - Prior to the procedure, a History and Physical                          was performed, and patient medications, allergies                          and sensitivities were reviewed. The " patient's                          tolerance of previous anesthesia was reviewed.                          - The risks and benefits of the procedure and the                          sedation options and risks were discussed with the                          patient. All questions were answered and informed                          consent was obtained.                          - ASA Grade Assessment: II - A patient with mild                          systemic disease.                          After I obtained informed consent, the scope was                          passed under direct vision. Throughout the                          procedure, the patient's blood pressure, pulse,                          and oxygen saturations were monitored continuously.                          The Olympus scope PCF-H190DL ([id number]) was                          introduced through the anus and advanced to the                          cecum, identified by appendiceal orifice and                          ileocecal valve. The colonoscopy was performed                          without difficulty. The patient tolerated the                          procedure well. The ileocecal valve, appendiceal                          orifice, and rectum were photographed. The quality                          of the bowel preparation was evaluated using the                          BBPS (Dalton Bowel Preparation Scale) with scores                          of: Right Colon = 3 (entire mucosa seen well with                          no residual staining, small fragments of stool or                          opaque liquid), Transverse Colon = 2 (minor amount                          of residual staining, small fragments of stool                          and/or opaque liquid, but mucosa seen well) and                          Left Colon = 3 (entire mucosa seen well with no                          residual staining, small fragments of stool or                           opaque liquid). The total BBPS score equals 8. The                          quality of the bowel preparation was good.   Findings:       The colon (entire examined portion) appeared normal.   Impression:            - The entire examined colon is normal.                          - No specimens collected.   Recommendation:        - Discharge patient to home.                          - Resume previous diet.                          - Continue present medications.                          - Repeat colonoscopy in 1 year for surveillance.   Attending Participation:        I personally performed the entire procedure.   Zara Barbosa MD   4/1/2022 1:56:51 PM   This report has been verified and signed electronically.   Dear patient,   As a result of recent federal legislation (The Federal Cures Act), you may   receive lab or pathology results from your procedure in your [facility]   account before your physician is able to contact you. Your physician or   their representative will relay the results to you with their   recommendations at their soonest availability.   Thank you,   Number of Addenda: 0   Note Initiated On: 4/1/2022 1:35 PM   Scope Withdrawal Time: 0 hours 9 minutes 34 seconds   Estimated Blood Loss:  Estimated blood loss: none.        This report has been verified and signed     ASSESSMENT/PLAN:    1. Encounter for general adult medical examination with abnormal findings  Overview:  - preventative health counseling  - counseling on current recommendations for breast cancer screening. Average risk  - counseling on current recommendations for cervical cancer screening.Up to date and followed by Gynecology    - counseling on current recommendations for colon cancer screening.  Increased risk with Saravia syndrome. Recommend colonoscopy every 1-2 years. She does not have any family history of colon or small bowel cancer. She is due for a colonoscopy.  - increase cancer risks with Saravia including endometrial and  ovarian (mother with endometrial and ovarian CA)      Orders:  -     TSH; Future; Expected date: 08/02/2023  -     Lipid Panel; Future; Expected date: 08/02/2023  -     HIV 1/2 Ag/Ab (4th Gen); Future; Expected date: 08/02/2023  -     Hepatitis C Antibody; Future; Expected date: 08/02/2023  -     Hemoglobin A1C; Future; Expected date: 08/02/2023  -     Comprehensive Metabolic Panel; Future; Expected date: 08/02/2023  -     CBC Auto Differential; Future; Expected date: 08/02/2023    2. Saravia syndrome  Overview:  - diagnosed 2022  - MSH2 mutation  - 2022 colonoscopy normal and GI rec repeat in 1 year      3. Generalized anxiety disorder  Overview:  - counseling on Anxiety, management and treatment options  - recommend start Buspirone 5 mg BID as needed  - recommend Magnesium glycinate 120 mg nightly   - recommend trial EMELIA supplemenation  - counseling regarding new medication including expected results, potential side effects, and appropriate use. Questions elicited and answered  - encouraged to patient to notify me of any questions or concerns      Orders:  -     busPIRone (BUSPAR) 5 MG Tab; Take 1 tablet (5 mg total) by mouth 2 (two) times daily as needed (anxiety).  Dispense: 30 tablet; Refill: 0    4. Screening for HIV (human immunodeficiency virus)  -     HIV 1/2 Ag/Ab (4th Gen); Future; Expected date: 08/02/2023    5. Need for hepatitis C screening test  -     Hepatitis C Antibody; Future; Expected date: 08/02/2023    6. Need for Tdap vaccination  -     Tdap Vaccine          ORDERS:   Orders Placed This Encounter    Tdap Vaccine    TSH    Lipid Panel    HIV 1/2 Ag/Ab (4th Gen)    Hepatitis C Antibody    Hemoglobin A1C    Comprehensive Metabolic Panel    CBC Auto Differential    busPIRone (BUSPAR) 5 MG Tab       Vaccines recommended: Tdap    Follow-up in 1 month anxiety or sooner if any concerns.      This note is dictated using the M*Modal Fluency Direct word recognition program. There are word recognition  mistakes that are occasionally missed on review.    Dr. Marimar Rojo D.O.   [facility]

## 2023-08-02 ENCOUNTER — OFFICE VISIT (OUTPATIENT)
Dept: PRIMARY CARE CLINIC | Facility: CLINIC | Age: 28
End: 2023-08-02
Attending: FAMILY MEDICINE
Payer: COMMERCIAL

## 2023-08-02 VITALS
SYSTOLIC BLOOD PRESSURE: 112 MMHG | WEIGHT: 146.5 LBS | HEART RATE: 63 BPM | HEIGHT: 62 IN | DIASTOLIC BLOOD PRESSURE: 52 MMHG | BODY MASS INDEX: 26.96 KG/M2 | OXYGEN SATURATION: 100 %

## 2023-08-02 DIAGNOSIS — Z15.09 LYNCH SYNDROME: ICD-10-CM

## 2023-08-02 DIAGNOSIS — Z23 NEED FOR TDAP VACCINATION: ICD-10-CM

## 2023-08-02 DIAGNOSIS — Z00.01 ENCOUNTER FOR GENERAL ADULT MEDICAL EXAMINATION WITH ABNORMAL FINDINGS: Primary | ICD-10-CM

## 2023-08-02 DIAGNOSIS — Z11.59 NEED FOR HEPATITIS C SCREENING TEST: ICD-10-CM

## 2023-08-02 DIAGNOSIS — Z11.4 SCREENING FOR HIV (HUMAN IMMUNODEFICIENCY VIRUS): ICD-10-CM

## 2023-08-02 DIAGNOSIS — F41.1 GENERALIZED ANXIETY DISORDER: ICD-10-CM

## 2023-08-02 PROCEDURE — 90715 TDAP VACCINE GREATER THAN OR EQUAL TO 7YO IM: ICD-10-PCS | Mod: S$GLB,,, | Performed by: FAMILY MEDICINE

## 2023-08-02 PROCEDURE — 99999 PR PBB SHADOW E&M-EST. PATIENT-LVL IV: ICD-10-PCS | Mod: PBBFAC,,, | Performed by: FAMILY MEDICINE

## 2023-08-02 PROCEDURE — 99385 PREV VISIT NEW AGE 18-39: CPT | Mod: 25,S$GLB,, | Performed by: FAMILY MEDICINE

## 2023-08-02 PROCEDURE — 1160F PR REVIEW ALL MEDS BY PRESCRIBER/CLIN PHARMACIST DOCUMENTED: ICD-10-PCS | Mod: CPTII,S$GLB,, | Performed by: FAMILY MEDICINE

## 2023-08-02 PROCEDURE — 3008F PR BODY MASS INDEX (BMI) DOCUMENTED: ICD-10-PCS | Mod: CPTII,S$GLB,, | Performed by: FAMILY MEDICINE

## 2023-08-02 PROCEDURE — 1160F RVW MEDS BY RX/DR IN RCRD: CPT | Mod: CPTII,S$GLB,, | Performed by: FAMILY MEDICINE

## 2023-08-02 PROCEDURE — 1159F PR MEDICATION LIST DOCUMENTED IN MEDICAL RECORD: ICD-10-PCS | Mod: CPTII,S$GLB,, | Performed by: FAMILY MEDICINE

## 2023-08-02 PROCEDURE — 90471 IMMUNIZATION ADMIN: CPT | Mod: S$GLB,,, | Performed by: FAMILY MEDICINE

## 2023-08-02 PROCEDURE — 3074F PR MOST RECENT SYSTOLIC BLOOD PRESSURE < 130 MM HG: ICD-10-PCS | Mod: CPTII,S$GLB,, | Performed by: FAMILY MEDICINE

## 2023-08-02 PROCEDURE — 3008F BODY MASS INDEX DOCD: CPT | Mod: CPTII,S$GLB,, | Performed by: FAMILY MEDICINE

## 2023-08-02 PROCEDURE — 3078F PR MOST RECENT DIASTOLIC BLOOD PRESSURE < 80 MM HG: ICD-10-PCS | Mod: CPTII,S$GLB,, | Performed by: FAMILY MEDICINE

## 2023-08-02 PROCEDURE — 3074F SYST BP LT 130 MM HG: CPT | Mod: CPTII,S$GLB,, | Performed by: FAMILY MEDICINE

## 2023-08-02 PROCEDURE — 90471 TDAP VACCINE GREATER THAN OR EQUAL TO 7YO IM: ICD-10-PCS | Mod: S$GLB,,, | Performed by: FAMILY MEDICINE

## 2023-08-02 PROCEDURE — 1159F MED LIST DOCD IN RCRD: CPT | Mod: CPTII,S$GLB,, | Performed by: FAMILY MEDICINE

## 2023-08-02 PROCEDURE — 99999 PR PBB SHADOW E&M-EST. PATIENT-LVL IV: CPT | Mod: PBBFAC,,, | Performed by: FAMILY MEDICINE

## 2023-08-02 PROCEDURE — 99385 PR PREVENTIVE VISIT,NEW,18-39: ICD-10-PCS | Mod: 25,S$GLB,, | Performed by: FAMILY MEDICINE

## 2023-08-02 PROCEDURE — 90715 TDAP VACCINE 7 YRS/> IM: CPT | Mod: S$GLB,,, | Performed by: FAMILY MEDICINE

## 2023-08-02 PROCEDURE — 3078F DIAST BP <80 MM HG: CPT | Mod: CPTII,S$GLB,, | Performed by: FAMILY MEDICINE

## 2023-08-02 RX ORDER — ERYTHROMYCIN 5 MG/G
OINTMENT OPHTHALMIC
COMMUNITY
Start: 2023-03-21 | End: 2023-08-02

## 2023-08-02 RX ORDER — BUSPIRONE HYDROCHLORIDE 5 MG/1
5 TABLET ORAL 2 TIMES DAILY PRN
Qty: 30 TABLET | Refills: 0 | Status: SHIPPED | OUTPATIENT
Start: 2023-08-02 | End: 2023-08-14

## 2023-08-02 RX ORDER — LORAZEPAM 1 MG/1
1 TABLET ORAL
COMMUNITY
Start: 2023-07-13 | End: 2023-09-21

## 2023-08-02 NOTE — PROGRESS NOTES
Two patient identifiers verified. Allergies reviewed.  Tdap IM administered to Left deltoid per order from Dr. Rojo. Dr. Rojo states that patient had a Tdap in the past with no complications.  Patient tolerated injection well: no redness, bleeding, or bruising noted to injection site. Patient instructed to remain in clinic setting for 15 minutes.

## 2023-08-07 ENCOUNTER — LAB VISIT (OUTPATIENT)
Dept: LAB | Facility: HOSPITAL | Age: 28
End: 2023-08-07
Attending: FAMILY MEDICINE
Payer: COMMERCIAL

## 2023-08-07 DIAGNOSIS — Z00.01 ENCOUNTER FOR GENERAL ADULT MEDICAL EXAMINATION WITH ABNORMAL FINDINGS: ICD-10-CM

## 2023-08-07 DIAGNOSIS — Z11.4 SCREENING FOR HIV (HUMAN IMMUNODEFICIENCY VIRUS): ICD-10-CM

## 2023-08-07 DIAGNOSIS — Z11.59 NEED FOR HEPATITIS C SCREENING TEST: ICD-10-CM

## 2023-08-07 LAB
ALBUMIN SERPL BCP-MCNC: 3.8 G/DL (ref 3.5–5.2)
ALP SERPL-CCNC: 47 U/L (ref 55–135)
ALT SERPL W/O P-5'-P-CCNC: 16 U/L (ref 10–44)
ANION GAP SERPL CALC-SCNC: 9 MMOL/L (ref 8–16)
AST SERPL-CCNC: 21 U/L (ref 10–40)
BASOPHILS # BLD AUTO: 0.05 K/UL (ref 0–0.2)
BASOPHILS NFR BLD: 1 % (ref 0–1.9)
BILIRUB SERPL-MCNC: 0.3 MG/DL (ref 0.1–1)
BUN SERPL-MCNC: 11 MG/DL (ref 6–20)
CALCIUM SERPL-MCNC: 9 MG/DL (ref 8.7–10.5)
CHLORIDE SERPL-SCNC: 106 MMOL/L (ref 95–110)
CHOLEST SERPL-MCNC: 207 MG/DL (ref 120–199)
CHOLEST/HDLC SERPL: 3.1 {RATIO} (ref 2–5)
CO2 SERPL-SCNC: 23 MMOL/L (ref 23–29)
CREAT SERPL-MCNC: 0.8 MG/DL (ref 0.5–1.4)
DIFFERENTIAL METHOD: ABNORMAL
EOSINOPHIL # BLD AUTO: 0.3 K/UL (ref 0–0.5)
EOSINOPHIL NFR BLD: 6.1 % (ref 0–8)
ERYTHROCYTE [DISTWIDTH] IN BLOOD BY AUTOMATED COUNT: 12.9 % (ref 11.5–14.5)
EST. GFR  (NO RACE VARIABLE): >60 ML/MIN/1.73 M^2
ESTIMATED AVG GLUCOSE: 100 MG/DL (ref 68–131)
GLUCOSE SERPL-MCNC: 83 MG/DL (ref 70–110)
HBA1C MFR BLD: 5.1 % (ref 4–5.6)
HCT VFR BLD AUTO: 38 % (ref 37–48.5)
HCV AB SERPL QL IA: NORMAL
HDLC SERPL-MCNC: 66 MG/DL (ref 40–75)
HDLC SERPL: 31.9 % (ref 20–50)
HGB BLD-MCNC: 12.6 G/DL (ref 12–16)
HIV 1+2 AB+HIV1 P24 AG SERPL QL IA: NORMAL
IMM GRANULOCYTES # BLD AUTO: 0.02 K/UL (ref 0–0.04)
IMM GRANULOCYTES NFR BLD AUTO: 0.4 % (ref 0–0.5)
LDLC SERPL CALC-MCNC: 128 MG/DL (ref 63–159)
LYMPHOCYTES # BLD AUTO: 2.5 K/UL (ref 1–4.8)
LYMPHOCYTES NFR BLD: 51.1 % (ref 18–48)
MCH RBC QN AUTO: 29 PG (ref 27–31)
MCHC RBC AUTO-ENTMCNC: 33.2 G/DL (ref 32–36)
MCV RBC AUTO: 88 FL (ref 82–98)
MONOCYTES # BLD AUTO: 0.3 K/UL (ref 0.3–1)
MONOCYTES NFR BLD: 6.7 % (ref 4–15)
NEUTROPHILS # BLD AUTO: 1.7 K/UL (ref 1.8–7.7)
NEUTROPHILS NFR BLD: 34.7 % (ref 38–73)
NONHDLC SERPL-MCNC: 141 MG/DL
NRBC BLD-RTO: 0 /100 WBC
PLATELET # BLD AUTO: 208 K/UL (ref 150–450)
PMV BLD AUTO: 11.2 FL (ref 9.2–12.9)
POTASSIUM SERPL-SCNC: 4.2 MMOL/L (ref 3.5–5.1)
PROT SERPL-MCNC: 7 G/DL (ref 6–8.4)
RBC # BLD AUTO: 4.34 M/UL (ref 4–5.4)
SODIUM SERPL-SCNC: 138 MMOL/L (ref 136–145)
TRIGL SERPL-MCNC: 65 MG/DL (ref 30–150)
TSH SERPL DL<=0.005 MIU/L-ACNC: 1.14 UIU/ML (ref 0.4–4)
WBC # BLD AUTO: 4.91 K/UL (ref 3.9–12.7)

## 2023-08-07 PROCEDURE — 87389 HIV-1 AG W/HIV-1&-2 AB AG IA: CPT | Performed by: FAMILY MEDICINE

## 2023-08-07 PROCEDURE — 83036 HEMOGLOBIN GLYCOSYLATED A1C: CPT | Performed by: FAMILY MEDICINE

## 2023-08-07 PROCEDURE — 85025 COMPLETE CBC W/AUTO DIFF WBC: CPT | Performed by: FAMILY MEDICINE

## 2023-08-07 PROCEDURE — 86803 HEPATITIS C AB TEST: CPT | Performed by: FAMILY MEDICINE

## 2023-08-07 PROCEDURE — 84443 ASSAY THYROID STIM HORMONE: CPT | Performed by: FAMILY MEDICINE

## 2023-08-07 PROCEDURE — 36415 COLL VENOUS BLD VENIPUNCTURE: CPT | Mod: PO | Performed by: FAMILY MEDICINE

## 2023-08-07 PROCEDURE — 80061 LIPID PANEL: CPT | Performed by: FAMILY MEDICINE

## 2023-08-07 PROCEDURE — 80053 COMPREHEN METABOLIC PANEL: CPT | Performed by: FAMILY MEDICINE

## 2023-08-14 DIAGNOSIS — F41.1 GENERALIZED ANXIETY DISORDER: ICD-10-CM

## 2023-08-14 RX ORDER — BUSPIRONE HYDROCHLORIDE 5 MG/1
5 TABLET ORAL 2 TIMES DAILY PRN
Qty: 30 TABLET | Refills: 0 | Status: SHIPPED | OUTPATIENT
Start: 2023-08-14 | End: 2023-09-01

## 2023-08-14 NOTE — TELEPHONE ENCOUNTER
Refill Encounter    PCP Visits: Recent Visits  Date Type Provider Dept   08/02/23 Office Visit Marimar Rojo DO [facility]   Showing recent visits within past 360 days and meeting all other requirements  Future Appointments  Date Type Provider Dept   09/21/23 Appointment Marimar Rojo DO [facility]   Showing future appointments within next 720 days and meeting all other requirements     Last 3 Blood Pressure:   BP Readings from Last 3 Encounters:   08/02/23 (!) 112/52   04/04/23 110/67   03/30/23 (!) 102/54     Preferred Pharmacy:   [id number] - [place], [place] - [address]  [address]  [place] [address]  Phone: [phone] Fax: [phone]    [facility] [address] [place] [address]  Phone: [phone] Fax: [phone]    Requested RX:  Requested Prescriptions     Pending Prescriptions Disp Refills    busPIRone (BUSPAR) 5 MG Tab [Pharmacy Med Name: BUSPIRONE HCL 5 MG TABLET] 30 tablet 0     Sig: TAKE 1 TABLET (5 MG TOTAL) BY MOUTH 2 (TWO) TIMES DAILY AS NEEDED (ANXIETY).      RX Route: Normal

## 2023-08-15 ENCOUNTER — PATIENT MESSAGE (OUTPATIENT)
Dept: PRIMARY CARE CLINIC | Facility: CLINIC | Age: 28
End: 2023-08-15
Payer: COMMERCIAL

## 2023-09-20 NOTE — PROGRESS NOTES
VIRTUAL VISIT/TELEMEDICINE VISIT  FAMILY MEDICINE  [facility]    The patient location is: [place]  The chief complaint leading to consultation is:   Chief Complaint   Patient presents with    Anxiety    Insomnia     Visit type: Virtual visit with synchronous audio and video   Total time spent: 30 minute  Each patient to whom he or she provides medical services by telemedicine is:  (1) informed of the relationship between the physician and patient and the respective role of any other health care provider with respect to management of the patient; and (2) notified that he or she may decline to receive medical services by telemedicine and may withdraw from such care at any time.      Reason for visit:   Chief Complaint   Patient presents with    Anxiety    Insomnia         SUBJECTIVE: Erma Garland is a 28 y.o. female  - with Saravia syndrome (MSH2 genetic mutation)  presents for follow-up anxiety    Gastroenterology:  Dr. Zara Barbosa MD  Gynecology: Dr.Marguerite GOPI Marsh MD    1. Anxiety    Today 9/21/23: Erma Garland presents today for follow-up anxiety since starting buspirone 5 mg twice a day as needed.  She reports during increased anxiety does seem to help.  She is only taking as needed and not daily.  However she still feels that she has these bouts of racing thoughts and difficulty relaxing throughout the week.  She is only taking the buspirone for panic attacks her severe irritability.  She is not started her magnesium or EMELIA supplementation.  She reports that she still has sleep issues at night.  She reports she is difficulty falling asleep and also waking early around 4:00 a.m..  She notices that sleep is interrupted about 2 to 3 times a week depending on stress levels.  She reports if she has a project do or something coming up she has more difficulty sleeping.  She also finds this when she travels.  For example today she knew she had an appointment with me and  she felt that her sleep was not restful and she had a difficult time falling asleep as well as waking too early.  She has hydroxyzine 10 mg from her last provider reports that she does not have side effects from it and she would like to see if she could increase the dose to help with sleep.  She is most concerned about weight gain related with SSRIs.    Prior notes:   8/2/23:  Erma Garland reports a history of anxiety and reports that she has been medication the past.  She reports that she tries to manage without medication.  She is having issues with sleep recently.  She reports that her last PCP prescribed hydroxyzine 10 mg at bedtime but she does not feel that it is effective.  She also feels that is not helpful during the day.  She has been on rest pain past.  She tries to limit her lorazepam for flights only.  She has had severe panic attacks in the past.  She reports was notably she had a panic attack prior to her colonoscopy last year.  She is interested in starting medication for anxiety but she would like to avoid a daily medication at this time.  She reports it does not seem to be a daily then.  She would like something that is as needed.  She would like to note there any alternatives to benzodiazepine or hydroxyzine for as needed anxiety management.    Panic attacks: yes   Hopelessness:  denies  Sleep issues:  yes   Suicidal thoughts:  denies  Thoughts of self harm: denies  Thoughts of harm to others:  denies  History of suicide attempts:  denies  Family history of suicide: denies    Psychiatrist: none  Psychologist: none  Counselor : none    Prior medication: hydroxyzine (not effective); fluoxetine (7113-9519: weight gain; decrease libido, decreased appetite but gaining), lorazepam    Current medications:   Lorazepam as needed for flying only  busPIRone (BUSPAR) 5 MG Tab, TAKE 1 TABLET (5 MG TOTAL) BY MOUTH 2 (TWO) TIMES DAILY AS NEEDED (ANXIETY)., Disp: 30 tablet, Rfl: 0    Side effects of  current treatment: denies    Support system: family and partner                Review of Systems   All other systems reviewed and are negative.      HEALTH MAINTENANCE:   Health Maintenance   Topic Date Due    Pap Smear  03/23/2025    TETANUS VACCINE  08/02/2033    Hepatitis C Screening  Completed    Lipid Panel  Completed     Health Maintenance Topics with due status: Not Due       Topic Last Completion Date    Pap Smear 03/23/2022    TETANUS VACCINE 08/02/2023     Health Maintenance Due   Topic Date Due    Influenza Vaccine (1) 09/01/2023       HISTORY:   Past Medical History:   Diagnosis Date    Acne     Anxiety     Depression     Saravia syndrome        Past Surgical History:   Procedure Laterality Date    COLON SURGERY  April 2022    Colonoscopy    COLONOSCOPY N/A 04/01/2022    Procedure: COLONOSCOPY;  Surgeon: Zara Barbosa MD;  Location: [address]);  Service: Endoscopy;  Laterality: N/A;  fully vaccinated    WISDOM TOOTH EXTRACTION         Family History   Problem Relation Age of Onset    Ovarian cancer Mother         s/p hys    Endometrial cancer Mother     Cancer Mother     Depression Mother     Mental illness Mother     Heart disease Father         unknown specific    No Known Problems Sister     Cancer Maternal Aunt         thyroid cancer    Cancer Maternal Grandfather         brain cancer    Early death Maternal Grandfather     Cancer Paternal Grandmother         unknown    Melanoma Neg Hx     Breast cancer Neg Hx     Colon cancer Neg Hx        Social History     Tobacco Use    Smoking status: Never    Smokeless tobacco: Never   Substance Use Topics    Alcohol use: Yes     Alcohol/week: 6.0 standard drinks of alcohol     Types: 2 Glasses of wine, 4 Drinks containing 0.5 oz of alcohol per week     Comment: 3-5 drinks weekly     Drug use: No       Social History     Social History Narrative    Single and lives with partner WSM since 2017. They have 2 cats. She is  "allergic but loves cats. She is from [place] and moved to [facility] 2014.     Does yoga daily. Likes to [place].     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        ALLERGIES:   Review of patient's allergies indicates:   Allergen Reactions    Latex, natural rubber Itching and Swelling    Doxycycline      Hives from sun    Cat dander Itching     rhinitis       MEDS:   Current Outpatient Medications on File Prior to Visit   Medication Sig Dispense Refill Last Dose    busPIRone (BUSPAR) 5 MG Tab TAKE 1 TABLET (5 MG TOTAL) BY MOUTH 2 (TWO) TIMES DAILY AS NEEDED (ANXIETY). 30 tablet 0 Taking    norethindrone-ethinyl estradiol (ORTHO-NOVUM 7/7/7, 28,) 0.5/0.75/1 mg- 35 mcg per tablet Take 1 tablet by mouth once daily. 84 tablet 3 Taking    [DISCONTINUED] hydrOXYzine HCL (ATARAX) 10 MG Tab Take 25 mg by mouth 3 (three) times daily as needed.       [DISCONTINUED] hydrOXYzine HCL (ATARAX) 25 MG tablet Take 25 mg by mouth 3 (three) times daily as needed.       [DISCONTINUED] LORazepam (ATIVAN) 1 MG tablet Take 1 mg by mouth.            Vital signs:   Vitals:    09/21/23 0817   Weight: 67.1 kg (148 lb)   Height: 5' 2" (1.575 m)       Body mass index is 27.07 kg/m².    PHYSICAL EXAM:     Physical Exam  Constitutional:       General: She is not in acute distress.  Pulmonary:      Effort: Pulmonary effort is normal. No respiratory distress.   Neurological:      Mental Status: She is alert.   Psychiatric:         Mood and Affect: Affect normal. Mood is anxious.         Speech: Speech normal.         Behavior: Behavior normal. Behavior is cooperative.         Thought Content: Thought content normal.           PERTINENT RESULTS:   No visits with results within 1 Week(s) from this visit.   Latest known visit with results is:   Lab Visit on 08/07/2023   Component Date Value Ref Range Status    TSH 08/07/2023 1.141  0.400 - 4.000 uIU/mL Final    Cholesterol 08/07/2023 207 (H)  120 - 199 mg/dL Final    Comment: The National " Cholesterol Education Program (NCEP) has set the  following guidelines (reference ranges) for Cholesterol:  Optimal.....................<200 mg/dL  Borderline High.............200-239 mg/dL  High........................> or = 240 mg/dL      Triglycerides 08/07/2023 65  30 - 150 mg/dL Final    Comment: The National Cholesterol Education Program (NCEP) has set the  following guidelines (reference values) for triglycerides:  Normal......................<150 mg/dL  Borderline High.............150-199 mg/dL  High........................200-499 mg/dL      HDL 08/07/2023 66  40 - 75 mg/dL Final    Comment: The National Cholesterol Education Program (NCEP) has set the  following guidelines (reference values) for HDL Cholesterol:  Low...............<40 mg/dL  Optimal...........>60 mg/dL      LDL Cholesterol 08/07/2023 128.0  63.0 - 159.0 mg/dL Final    Comment: The National Cholesterol Education Program (NCEP) has set the  following guidelines (reference values) for LDL Cholesterol:  Optimal.......................<130 mg/dL  Borderline High...............130-159 mg/dL  High..........................160-189 mg/dL  Very High.....................>190 mg/dL      HDL/Cholesterol Ratio 08/07/2023 31.9  20.0 - 50.0 % Final    Total Cholesterol/HDL Ratio 08/07/2023 3.1  2.0 - 5.0 Final    Non-HDL Cholesterol 08/07/2023 141  mg/dL Final    Comment: Risk category and Non-HDL cholesterol goals:  Coronary heart disease (CHD)or equivalent (10-year risk of CHD >20%):  Non-HDL cholesterol goal     <130 mg/dL  Two or more CHD risk factors and 10-year risk of CHD <= 20%:  Non-HDL cholesterol goal     <160 mg/dL  0 to 1 CHD risk factor:  Non-HDL cholesterol goal     <190 mg/dL      HIV 1/2 Ag/Ab 08/07/2023 Non-reactive  Non-reactive Final    Hepatitis C Ab 08/07/2023 Non-reactive  Non-reactive Final    Hemoglobin A1C 08/07/2023 5.1  4.0 - 5.6 % Final    Comment: ADA Screening Guidelines:  5.7-6.4%  Consistent with  prediabetes  >or=6.5%  Consistent with diabetes    High levels of fetal hemoglobin interfere with the HbA1C  assay. Heterozygous hemoglobin variants (HbS, HgC, etc)do  not significantly interfere with this assay.   However, presence of multiple variants may affect accuracy.      Estimated Avg Glucose 08/07/2023 100  68 - 131 mg/dL Final    Sodium 08/07/2023 138  136 - 145 mmol/L Final    Potassium 08/07/2023 4.2  3.5 - 5.1 mmol/L Final    Chloride 08/07/2023 106  95 - 110 mmol/L Final    CO2 08/07/2023 23  23 - 29 mmol/L Final    Glucose 08/07/2023 83  70 - 110 mg/dL Final    BUN 08/07/2023 11  6 - 20 mg/dL Final    Creatinine 08/07/2023 0.8  0.5 - 1.4 mg/dL Final    Calcium 08/07/2023 9.0  8.7 - 10.5 mg/dL Final    Total Protein 08/07/2023 7.0  6.0 - 8.4 g/dL Final    Albumin 08/07/2023 3.8  3.5 - 5.2 g/dL Final    Total Bilirubin 08/07/2023 0.3  0.1 - 1.0 mg/dL Final    Comment: For infants and newborns, interpretation of results should be based  on gestational age, weight and in agreement with clinical  observations.    Premature Infant recommended reference ranges:  Up to 24 hours.............<8.0 mg/dL  Up to 48 hours............<12.0 mg/dL  3-5 days..................<15.0 mg/dL  6-29 days.................<15.0 mg/dL      Alkaline Phosphatase 08/07/2023 47 (L)  55 - 135 U/L Final    AST 08/07/2023 21  10 - 40 U/L Final    ALT 08/07/2023 16  10 - 44 U/L Final    eGFR 08/07/2023 >60.0  >60 mL/min/1.73 m^2 Final    Anion Gap 08/07/2023 9  8 - 16 mmol/L Final    WBC 08/07/2023 4.91  3.90 - 12.70 K/uL Final    RBC 08/07/2023 4.34  4.00 - 5.40 M/uL Final    Hemoglobin 08/07/2023 12.6  12.0 - 16.0 g/dL Final    Hematocrit 08/07/2023 38.0  37.0 - 48.5 % Final    MCV 08/07/2023 88  82 - 98 fL Final    MCH 08/07/2023 29.0  27.0 - 31.0 pg Final    MCHC 08/07/2023 33.2  32.0 - 36.0 g/dL Final    RDW 08/07/2023 12.9  11.5 - 14.5 % Final    Platelets 08/07/2023 208  150 - 450 K/uL Final    MPV  08/07/2023 11.2  9.2 - 12.9 fL Final    Immature Granulocytes 08/07/2023 0.4  0.0 - 0.5 % Final    Gran # (ANC) 08/07/2023 1.7 (L)  1.8 - 7.7 K/uL Final    Immature Grans (Abs) 08/07/2023 0.02  0.00 - 0.04 K/uL Final    Comment: Mild elevation in immature granulocytes is non specific and   can be seen in a variety of conditions including stress response,   acute inflammation, trauma and pregnancy. Correlation with other   laboratory and clinical findings is essential.      Lymph # 08/07/2023 2.5  1.0 - 4.8 K/uL Final    Mono # 08/07/2023 0.3  0.3 - 1.0 K/uL Final    Eos # 08/07/2023 0.3  0.0 - 0.5 K/uL Final    Baso # 08/07/2023 0.05  0.00 - 0.20 K/uL Final    nRBC 08/07/2023 0  0 /100 WBC Final    Gran % 08/07/2023 34.7 (L)  38.0 - 73.0 % Final    Lymph % 08/07/2023 51.1 (H)  18.0 - 48.0 % Final    Mono % 08/07/2023 6.7  4.0 - 15.0 % Final    Eosinophil % 08/07/2023 6.1  0.0 - 8.0 % Final    Basophil % 08/07/2023 1.0  0.0 - 1.9 % Final    Differential Method 08/07/2023 Automated   Final     Lab Results   Component Value Date    WBC 4.91 08/07/2023    HGB 12.6 08/07/2023    HCT 38.0 08/07/2023    MCV 88 08/07/2023     08/07/2023       CMP  Sodium   Date Value Ref Range Status   08/07/2023 138 136 - 145 mmol/L Final     Potassium   Date Value Ref Range Status   08/07/2023 4.2 3.5 - 5.1 mmol/L Final     Chloride   Date Value Ref Range Status   08/07/2023 106 95 - 110 mmol/L Final     CO2   Date Value Ref Range Status   08/07/2023 23 23 - 29 mmol/L Final     Glucose   Date Value Ref Range Status   08/07/2023 83 70 - 110 mg/dL Final     BUN   Date Value Ref Range Status   08/07/2023 11 6 - 20 mg/dL Final     Creatinine   Date Value Ref Range Status   08/07/2023 0.8 0.5 - 1.4 mg/dL Final     Calcium   Date Value Ref Range Status   08/07/2023 9.0 8.7 - 10.5 mg/dL Final     Total Protein   Date Value Ref Range Status   08/07/2023 7.0 6.0 - 8.4 g/dL Final     Albumin   Date Value Ref Range Status  "  08/07/2023 3.8 3.5 - 5.2 g/dL Final     Total Bilirubin   Date Value Ref Range Status   08/07/2023 0.3 0.1 - 1.0 mg/dL Final     Comment:     For infants and newborns, interpretation of results should be based  on gestational age, weight and in agreement with clinical  observations.    Premature Infant recommended reference ranges:  Up to 24 hours.............<8.0 mg/dL  Up to 48 hours............<12.0 mg/dL  3-5 days..................<15.0 mg/dL  6-29 days.................<15.0 mg/dL       Alkaline Phosphatase   Date Value Ref Range Status   08/07/2023 47 (L) 55 - 135 U/L Final     AST   Date Value Ref Range Status   08/07/2023 21 10 - 40 U/L Final     ALT   Date Value Ref Range Status   08/07/2023 16 10 - 44 U/L Final     Anion Gap   Date Value Ref Range Status   08/07/2023 9 8 - 16 mmol/L Final     eGFR   Date Value Ref Range Status   08/07/2023 >60.0 >60 mL/min/1.73 m^2 Final     Lab Results   Component Value Date    CHOL 207 (H) 08/07/2023    CHOL 199 01/31/2022     Lab Results   Component Value Date    HDL 66 08/07/2023    HDL 60 01/31/2022     Lab Results   Component Value Date    LDLCALC 128.0 08/07/2023    LDLCALC 125.4 01/31/2022     No results found for: "DLDL"  Lab Results   Component Value Date    TRIG 65 08/07/2023    TRIG 68 01/31/2022       f1   Lab Results   Component Value Date    CHOLHDL 31.9 08/07/2023    CHOLHDL 30.2 01/31/2022       ASSESSMENT/PLAN:    1. Generalized anxiety disorder  Overview:  - counseling on Anxiety, management and treatment options  - discussed that SSRIs may be better for long-term management of anxiety however she is concerned with weight gain and decreased libido as a side effect fluoxetine which she was on previously from [id number].  She reports she had waking on medication and recently she has had some nodule weight gain has caused increased anxiety and she does not want to exacerbate the symptoms  - discuss balancing potential side effects SSRIs as well as potential " benefits.  We discussed switching her buspirone to an SSRI versus scheduling her buspirone 5 mg twice a day for better management of her anxiety and up titrating as tolerated.  She opted to take her buspirone daily and up titrate as tolerated before considering an SSRI or SNRI  Recommendations:   Still consider starting magnesium glycinate 120 mg at bedtime and EMELIA supplementation for anxiety   Take buspirone 5 mg twice a day every day with breakfast and dinner.  If tolerated well but anxiety still present in 2 weeks okay to increase to 10 mg twice a day  Start hydroxyzine 25-50 mg at bedtime as needed for sleep.  Notify me if no improvement and we can consider trazodone as a sleep aid   - counseling regarding new medication including expected results, potential side effects, and appropriate use. Questions elicited and answered  - encouraged to patient to notify me of any questions or concerns        2. Primary insomnia  Overview:  - counseling on sleep management   -okay to trial hydroxyzine at bedtime  -also recommend mindfulness exercises at bedtime    Orders:  -     hydrOXYzine HCL (ATARAX) 25 MG tablet; Take 1 tablet (25 mg total) by mouth nightly as needed for Itching.  Dispense: 30 tablet; Refill: 5            ORDERS:   Orders Placed This Encounter    hydrOXYzine HCL (ATARAX) 25 MG tablet         Vaccines recommended:  Flu vaccine and COVID-19 booster    Follow-up in 1 month or sooner if any concerns.      This note is dictated using the M*Modal Fluency Direct word recognition program. There are word recognition mistakes that are occasionally missed on review.    Dr. Marimar Rojo D.O.   [facility]

## 2023-09-21 ENCOUNTER — OFFICE VISIT (OUTPATIENT)
Dept: PRIMARY CARE CLINIC | Facility: CLINIC | Age: 28
End: 2023-09-21
Attending: FAMILY MEDICINE
Payer: COMMERCIAL

## 2023-09-21 VITALS — HEIGHT: 62 IN | WEIGHT: 148 LBS | BODY MASS INDEX: 27.23 KG/M2

## 2023-09-21 DIAGNOSIS — F51.01 PRIMARY INSOMNIA: ICD-10-CM

## 2023-09-21 DIAGNOSIS — F41.1 GENERALIZED ANXIETY DISORDER: Primary | ICD-10-CM

## 2023-09-21 PROBLEM — Z00.01 ENCOUNTER FOR GENERAL ADULT MEDICAL EXAMINATION WITH ABNORMAL FINDINGS: Status: RESOLVED | Noted: 2023-08-02 | Resolved: 2023-09-21

## 2023-09-21 PROCEDURE — 1159F MED LIST DOCD IN RCRD: CPT | Mod: CPTII,95,, | Performed by: FAMILY MEDICINE

## 2023-09-21 PROCEDURE — 99214 PR OFFICE/OUTPT VISIT, EST, LEVL IV, 30-39 MIN: ICD-10-PCS | Mod: 95,,, | Performed by: FAMILY MEDICINE

## 2023-09-21 PROCEDURE — 3044F PR MOST RECENT HEMOGLOBIN A1C LEVEL <7.0%: ICD-10-PCS | Mod: CPTII,95,, | Performed by: FAMILY MEDICINE

## 2023-09-21 PROCEDURE — 3008F PR BODY MASS INDEX (BMI) DOCUMENTED: ICD-10-PCS | Mod: CPTII,95,, | Performed by: FAMILY MEDICINE

## 2023-09-21 PROCEDURE — 3008F BODY MASS INDEX DOCD: CPT | Mod: CPTII,95,, | Performed by: FAMILY MEDICINE

## 2023-09-21 PROCEDURE — 1159F PR MEDICATION LIST DOCUMENTED IN MEDICAL RECORD: ICD-10-PCS | Mod: CPTII,95,, | Performed by: FAMILY MEDICINE

## 2023-09-21 PROCEDURE — 3044F HG A1C LEVEL LT 7.0%: CPT | Mod: CPTII,95,, | Performed by: FAMILY MEDICINE

## 2023-09-21 PROCEDURE — 99214 OFFICE O/P EST MOD 30 MIN: CPT | Mod: 95,,, | Performed by: FAMILY MEDICINE

## 2023-09-21 RX ORDER — HYDROXYZINE HYDROCHLORIDE 25 MG/1
25 TABLET, FILM COATED ORAL NIGHTLY PRN
Qty: 30 TABLET | Refills: 5 | Status: SHIPPED | OUTPATIENT
Start: 2023-09-21 | End: 2023-12-22 | Stop reason: SDUPTHER

## 2023-09-21 RX ORDER — HYDROXYZINE HYDROCHLORIDE 10 MG/1
25 TABLET, FILM COATED ORAL 3 TIMES DAILY PRN
COMMUNITY
End: 2023-09-21

## 2023-09-21 RX ORDER — HYDROXYZINE HYDROCHLORIDE 25 MG/1
25 TABLET, FILM COATED ORAL 3 TIMES DAILY PRN
COMMUNITY
End: 2023-09-21

## 2023-09-21 NOTE — PATIENT INSTRUCTIONS
Recommendations:   Still consider starting magnesium glycinate 120 mg at bedtime and EMELIA supplementation for anxiety   Take buspirone 5 mg twice a day every day with breakfast and dinner.  If tolerated well but anxiety still present in 2 weeks okay to increase to 10 mg twice a day  Start hydroxyzine 25-50 mg at bedtime as needed for sleep.  Notify me if no improvement and we can consider trazodone as a sleep aid   I also recommend you get the Calm camilo or Head Space camilo for mindfulness exercise to assist with sleep.

## 2023-09-25 ENCOUNTER — TELEPHONE (OUTPATIENT)
Dept: PRIMARY CARE CLINIC | Facility: CLINIC | Age: 28
End: 2023-09-25
Payer: COMMERCIAL

## 2023-09-26 NOTE — TELEPHONE ENCOUNTER
----- Message from Marimar Rojo DO sent at 9/21/2023  8:23 AM CDT -----  Please scheduled 4-6 weeks virtual anxiety and insomnia follow-up

## 2023-09-30 ENCOUNTER — PATIENT MESSAGE (OUTPATIENT)
Dept: PRIMARY CARE CLINIC | Facility: CLINIC | Age: 28
End: 2023-09-30
Payer: COMMERCIAL

## 2023-09-30 DIAGNOSIS — F41.1 GENERALIZED ANXIETY DISORDER: Primary | ICD-10-CM

## 2023-10-02 RX ORDER — ESCITALOPRAM OXALATE 10 MG/1
10 TABLET ORAL DAILY
Qty: 30 TABLET | Refills: 1 | Status: SHIPPED | OUTPATIENT
Start: 2023-10-02 | End: 2023-10-25

## 2023-10-20 ENCOUNTER — PATIENT MESSAGE (OUTPATIENT)
Dept: PRIMARY CARE CLINIC | Facility: CLINIC | Age: 28
End: 2023-10-20
Payer: COMMERCIAL

## 2023-10-25 DIAGNOSIS — F41.1 GENERALIZED ANXIETY DISORDER: ICD-10-CM

## 2023-10-25 RX ORDER — BUSPIRONE HYDROCHLORIDE 5 MG/1
5 TABLET ORAL 2 TIMES DAILY PRN
Qty: 30 TABLET | Refills: 2 | Status: SHIPPED | OUTPATIENT
Start: 2023-10-25 | End: 2023-12-22 | Stop reason: SDUPTHER

## 2023-10-25 NOTE — TELEPHONE ENCOUNTER
No care due was identified.  [facility] Embedded Care Due Messages. Reference number: [id number].   10/25/2023 8:34:33 AM CDT

## 2023-10-25 NOTE — TELEPHONE ENCOUNTER
Refill Routing Note   Medication(s) are not appropriate for processing by [facility] for the following reason(s):      Medication outside of protocol  Responsible provider unclear    [facility] action(s):  Route Care Due:  None identified            Appointments  past 12m or future 3m with PCP    Date Provider   Last Visit   9/21/2023 Marimar Rojo, DO   Next Visit   10/25/2023 Marimar Rojo, DO   ED visits in past 90 days: 0        Note composed:1:43 PM 10/25/2023

## 2023-10-25 NOTE — TELEPHONE ENCOUNTER
Refill Encounter    PCP Visits: Recent Visits  Date Type Provider Dept   09/21/23 Office Visit Marimar Rojo,  [facility]   08/02/23 Office Visit Marimar Rojo,  [facility]   Showing recent visits within past 360 days and meeting all other requirements  Future Appointments  Date Type Provider Dept   11/09/23 Appointment Marimar Rojo,  [facility]   Showing future appointments within next 720 days and meeting all other requirements     Last 3 Blood Pressure:   BP Readings from Last 3 Encounters:   08/02/23 (!) 112/52   04/04/23 110/67   03/30/23 (!) 102/54     Preferred Pharmacy:   [facility]/pharmacy #[id number] - [place] - [address]  [address]  [facility] [address]  Phone: [phone] Fax: [phone]      Requested RX:  Requested Prescriptions     Pending Prescriptions Disp Refills    busPIRone (BUSPAR) 5 MG Tab 30 tablet 0     Sig: Take 1 tablet (5 mg total) by mouth 2 (two) times daily as needed (anxiety).      RX Route: Normal

## 2023-12-22 DIAGNOSIS — F41.1 GENERALIZED ANXIETY DISORDER: ICD-10-CM

## 2023-12-22 DIAGNOSIS — F51.01 PRIMARY INSOMNIA: ICD-10-CM

## 2023-12-22 NOTE — TELEPHONE ENCOUNTER
Refill Encounter    PCP Visits: Recent Visits  Date Type Provider Dept   09/21/23 Office Visit Marimar Rojo, DO [facility]   08/02/23 Office Visit Marimar Rojo, DO [facility]   Showing recent visits within past 360 days and meeting all other requirements  Future Appointments  No visits were found meeting these conditions.  Showing future appointments within next 720 days and meeting all other requirements     Last 3 Blood Pressure:   BP Readings from Last 3 Encounters:   08/02/23 (!) 112/52   04/04/23 110/67   03/30/23 (!) 102/54     Preferred Pharmacy:   [facility]/pharmacy #[id number] - [place], [place] - [address]  [address]  [place] [address]  Phone: [phone] Fax: [phone]    [facility] [address] [place] [address]  Phone: [phone] Fax: [phone]    Requested RX:  Requested Prescriptions     Pending Prescriptions Disp Refills    hydrOXYzine HCL (ATARAX) 25 MG tablet 30 tablet 5     Sig: Take 1 tablet (25 mg total) by mouth nightly as needed for Itching.    busPIRone (BUSPAR) 5 MG Tab 30 tablet 2     Sig: Take 1 tablet (5 mg total) by mouth 2 (two) times daily as needed (anxiety).      RX Route: Normal

## 2023-12-22 NOTE — TELEPHONE ENCOUNTER
No care due was identified.  [facility] Embedded Care Due Messages. Reference number: [id number].   12/22/2023 2:42:16 PM CST

## 2023-12-26 RX ORDER — BUSPIRONE HYDROCHLORIDE 5 MG/1
5 TABLET ORAL 2 TIMES DAILY PRN
Qty: 30 TABLET | Refills: 2 | Status: SHIPPED | OUTPATIENT
Start: 2023-12-26 | End: 2024-02-05

## 2023-12-26 RX ORDER — HYDROXYZINE HYDROCHLORIDE 25 MG/1
25 TABLET, FILM COATED ORAL NIGHTLY PRN
Qty: 30 TABLET | Refills: 5 | Status: SHIPPED | OUTPATIENT
Start: 2023-12-26 | End: 2024-02-05

## 2023-12-29 ENCOUNTER — PATIENT MESSAGE (OUTPATIENT)
Dept: PRIMARY CARE CLINIC | Facility: CLINIC | Age: 28
End: 2023-12-29
Payer: COMMERCIAL

## 2024-01-02 NOTE — TELEPHONE ENCOUNTER
Please offer virtual visit to discuss    Dr. Marimar Rojo D.O.   [facility]

## 2024-01-22 ENCOUNTER — PATIENT MESSAGE (OUTPATIENT)
Dept: PRIMARY CARE CLINIC | Facility: CLINIC | Age: 29
End: 2024-01-22
Payer: COMMERCIAL

## 2024-02-05 ENCOUNTER — OFFICE VISIT (OUTPATIENT)
Dept: PRIMARY CARE CLINIC | Facility: CLINIC | Age: 29
End: 2024-02-05
Attending: FAMILY MEDICINE
Payer: COMMERCIAL

## 2024-02-05 VITALS — WEIGHT: 140 LBS | BODY MASS INDEX: 25.61 KG/M2

## 2024-02-05 DIAGNOSIS — R41.840 IMPAIRED CONCENTRATION: ICD-10-CM

## 2024-02-05 DIAGNOSIS — F41.1 GENERALIZED ANXIETY DISORDER: Primary | ICD-10-CM

## 2024-02-05 DIAGNOSIS — F51.04 PSYCHOPHYSIOLOGICAL INSOMNIA: ICD-10-CM

## 2024-02-05 DIAGNOSIS — F40.243 FEAR OF FLYING: ICD-10-CM

## 2024-02-05 PROCEDURE — 99214 OFFICE O/P EST MOD 30 MIN: CPT | Mod: 95,,, | Performed by: FAMILY MEDICINE

## 2024-02-05 PROCEDURE — 1160F RVW MEDS BY RX/DR IN RCRD: CPT | Mod: CPTII,95,, | Performed by: FAMILY MEDICINE

## 2024-02-05 PROCEDURE — 3008F BODY MASS INDEX DOCD: CPT | Mod: CPTII,95,, | Performed by: FAMILY MEDICINE

## 2024-02-05 PROCEDURE — 1159F MED LIST DOCD IN RCRD: CPT | Mod: CPTII,95,, | Performed by: FAMILY MEDICINE

## 2024-02-05 RX ORDER — ALPRAZOLAM 0.5 MG/1
0.5 TABLET ORAL DAILY PRN
Qty: 15 TABLET | Refills: 1 | Status: SHIPPED | OUTPATIENT
Start: 2024-02-05

## 2024-02-05 RX ORDER — ESCITALOPRAM OXALATE 20 MG/1
20 TABLET ORAL DAILY
Qty: 90 TABLET | Refills: 1 | Status: SHIPPED | OUTPATIENT
Start: 2024-02-05 | End: 2024-08-03

## 2024-02-05 RX ORDER — HYDROXYZINE HYDROCHLORIDE 25 MG/1
25 TABLET, FILM COATED ORAL NIGHTLY PRN
Qty: 30 TABLET | Refills: 5 | Status: SHIPPED | OUTPATIENT
Start: 2024-02-05 | End: 2024-05-20 | Stop reason: SDUPTHER

## 2024-02-05 NOTE — PROGRESS NOTES
VIRTUAL/TELEMEDICINE VISIT   FAMILY MEDICINE  [facility]    The patient location is: [place]  The chief complaint leading to consultation is:   Chief Complaint   Patient presents with    Anxiety    Insomnia    concern for ADHD     Visit type: Virtual visit with synchronous audio and video   Total time spent: 30 minute  Each patient to whom he or she provides medical services by telemedicine is:  (1) informed of the relationship between the physician and patient and the respective role of any other health care provider with respect to management of the patient; and (2) notified that he or she may decline to receive medical services by telemedicine and may withdraw from such care at any time.    Reason for visit:   Chief Complaint   Patient presents with    Anxiety    Insomnia    concern for ADHD       SUBJECTIVE: Erma Garland is a 28 y.o. female  - with Saravia syndrome (MSH2 genetic mutation)  presents for concerns for persistent since sleep and anxiety issues. The patient's last visit with me was on 9/21/2023.    Gastroenterology:  Dr. Zara Barbosa MD  Gynecology: Dr.Marguerite GOPI Marsh MD    1. Anxiety    Today 2/5/24: Erma Garland was last seen by me 09/21/2023.  Since last visit she did not increase her buspirone 5 mg twice a day to 10 mg.  She reports buspirone 5 mg not effective.  She has not taking it regularly.  She did start escitalopram 10 mg daily.  She reports that it has been helpful and seems to be improving some of her anxiety but she is still anxious.  She still has difficulty sleeping.  She is waking up throughout the night.  She reports hydroxyzine 25 mg has been slightly helpful but she still wakes up frequently and has difficulty falling asleep.  She reports that her mind seems you racing with multiple issues at once.  She also reports that she is difficulty completing tasks.  She has never been tested for ADHD but recently she is has been concerned that it  is affecting her symptoms.  She reports that she will several things going on at once and have difficult time completing any of them.  She reports this all contributes to her anxiety  She also has a fear flying.  She tried to buspirone for flight phobia however she reports it was not effective and was very difficult for her.  She does travel regularly for work.  She is taken alprazolam 0.5 mg in the past which has been effective.  She has been prescribed lorazepam 1 mg prior to dental procedure reports that it was not effective.    Prior notes:  9/21/23: Erma Garland presents today for follow-up anxiety since starting buspirone 5 mg twice a day as needed.  She reports during increased anxiety does seem to help.  She is only taking as needed and not daily.  However she still feels that she has these bouts of racing thoughts and difficulty relaxing throughout the week.  She is only taking the buspirone for panic attacks her severe irritability.  She is not started her magnesium or EMELIA supplementation.  She reports that she still has sleep issues at night.  She reports she is difficulty falling asleep and also waking early around 4:00 a.m..  She notices that sleep is interrupted about 2 to 3 times a week depending on stress levels.  She reports if she has a project do or something coming up she has more difficulty sleeping.  She also finds this when she travels.  For example today she knew she had an appointment with me and she felt that her sleep was not restful and she had a difficult time falling asleep as well as waking too early.  She has hydroxyzine 10 mg from her last provider reports that she does not have side effects from it and she would like to see if she could increase the dose to help with sleep.  She is most concerned about weight gain related with SSRIs.   8/2/23:  Erma Garland reports a history of anxiety and reports that she has been medication the past.  She reports that she tries to  manage without medication.  She is having issues with sleep recently.  She reports that her last PCP prescribed hydroxyzine 10 mg at bedtime but she does not feel that it is effective.  She also feels that is not helpful during the day.  She has been on rest pain past.  She tries to limit her lorazepam for flights only.  She has had severe panic attacks in the past.  She reports was notably she had a panic attack prior to her colonoscopy last year.  She is interested in starting medication for anxiety but she would like to avoid a daily medication at this time.  She reports it does not seem to be a daily then.  She would like something that is as needed.  She would like to note there any alternatives to benzodiazepine or hydroxyzine for as needed anxiety management.    Panic attacks: yes   Hopelessness:  denies  Sleep issues:  yes   Suicidal thoughts:  denies  Thoughts of self harm: denies  Thoughts of harm to others:  denies  History of suicide attempts:  denies  Family history of suicide: denies    Psychiatrist: none  Psychologist: none  Counselor : none    Prior medication: hydroxyzine (not effective); fluoxetine (3465-3826: weight gain; decrease libido, decreased appetite but gaining), lorazepam    Current medications:   busPIRone (BUSPAR) 5 MG Tab, Take 1 tablet (5 mg total) by mouth 2 (two) times daily as needed (anxiety)., Disp: 30 tablet, Rfl: 2  EScitalopram oxalate (LEXAPRO) 10 MG tablet, TAKE 1 TABLET BY MOUTH EVERY DAY, Disp: 90 tablet, Rfl: 3  hydrOXYzine HCL (ATARAX) 25 MG tablet, Take 1 tablet (25 mg total) by mouth nightly as needed for Itching., Disp: 30 tablet, Rfl: 5    Side effects of current treatment: denies    Support system: family and partner          Review of Systems   All other systems reviewed and are negative.        HISTORY:   Past Medical History:   Diagnosis Date    Acne     Anxiety     Depression     Saravia syndrome        Past Surgical History:   Procedure Laterality Date    COLON  SURGERY  April 2022    Colonoscopy    COLONOSCOPY N/A 04/01/2022    Procedure: COLONOSCOPY;  Surgeon: Zara Barbosa MD;  Location: [facility] ([address] [place] [address]);  Service: Endoscopy;  Laterality: N/A;  fully vaccinated    WISDOM TOOTH EXTRACTION         Family History   Problem Relation Age of Onset    Ovarian cancer Mother         s/p hys    Endometrial cancer Mother     Cancer Mother     Depression Mother     Mental illness Mother     Heart disease Father         unknown specific    No Known Problems Sister     Cancer Maternal Aunt         thyroid cancer    Cancer Maternal Grandfather         brain cancer    Early death Maternal Grandfather     Cancer Paternal Grandmother         unknown    Melanoma Neg Hx     Breast cancer Neg Hx     Colon cancer Neg Hx        Social History     Tobacco Use    Smoking status: Never    Smokeless tobacco: Never   Substance Use Topics    Alcohol use: Yes     Alcohol/week: 6.0 standard drinks of alcohol     Types: 2 Glasses of wine, 4 Drinks containing 0.5 oz of alcohol per week     Comment: 3-5 drinks weekly     Drug use: No       Social History     Social History Narrative    Single and lives with partner WSM since 2017. They have 2 cats. She is allergic but loves cats. She is from [place] and moved to [facility] 2014.     Does yoga daily. Likes to cuaQea.     Works as [employer].        ALLERGIES:   Review of patient's allergies indicates:   Allergen Reactions    Latex, natural rubber Itching and Swelling    Doxycycline      Hives from sun    Cat dander Itching     rhinitis       MEDS:   Current Outpatient Medications on File Prior to Visit   Medication Sig Dispense Refill Last Dose    norethindrone-ethinyl estradiol (ORTHO-NOVUM 7/7/7, 28,) 0.5/0.75/1 mg- 35 mcg per tablet Take 1 tablet by mouth once daily. 84 tablet 3 Taking    [DISCONTINUED] EScitalopram oxalate (LEXAPRO) 10 MG tablet TAKE 1 TABLET BY MOUTH EVERY DAY 90 tablet 3 Taking    [DISCONTINUED] busPIRone (BUSPAR) 5 MG  Tab Take 1 tablet (5 mg total) by mouth 2 (two) times daily as needed (anxiety). 30 tablet 2     [DISCONTINUED] hydrOXYzine HCL (ATARAX) 25 MG tablet Take 1 tablet (25 mg total) by mouth nightly as needed for Itching. 30 tablet 5        Vital signs:   Vitals:    02/05/24 1500   Weight: 63.5 kg (140 lb)     Body mass index is 25.61 kg/m².    PHYSICAL EXAM:     Physical Exam  Constitutional:       General: She is not in acute distress.  Pulmonary:      Effort: Pulmonary effort is normal. No respiratory distress.   Neurological:      Mental Status: She is alert.   Psychiatric:         Speech: Speech normal.           PERTINENT RESULTS:   No visits with results within 1 Week(s) from this visit.   Latest known visit with results is:   Lab Visit on 08/07/2023   Component Date Value Ref Range Status    TSH 08/07/2023 1.141  0.400 - 4.000 uIU/mL Final    Cholesterol 08/07/2023 207 (H)  120 - 199 mg/dL Final    Comment: The National Cholesterol Education Program (NCEP) has set the  following guidelines (reference ranges) for Cholesterol:  Optimal.....................<200 mg/dL  Borderline High.............200-239 mg/dL  High........................> or = 240 mg/dL      Triglycerides 08/07/2023 65  30 - 150 mg/dL Final    Comment: The National Cholesterol Education Program (NCEP) has set the  following guidelines (reference values) for triglycerides:  Normal......................<150 mg/dL  Borderline High.............150-199 mg/dL  High........................200-499 mg/dL      HDL 08/07/2023 66  40 - 75 mg/dL Final    Comment: The National Cholesterol Education Program (NCEP) has set the  following guidelines (reference values) for HDL Cholesterol:  Low...............<40 mg/dL  Optimal...........>60 mg/dL      LDL Cholesterol 08/07/2023 128.0  63.0 - 159.0 mg/dL Final    Comment: The National Cholesterol Education Program (NCEP) has set the  following guidelines (reference values) for LDL  Cholesterol:  Optimal.......................<130 mg/dL  Borderline High...............130-159 mg/dL  High..........................160-189 mg/dL  Very High.....................>190 mg/dL      HDL/Cholesterol Ratio 08/07/2023 31.9  20.0 - 50.0 % Final    Total Cholesterol/HDL Ratio 08/07/2023 3.1  2.0 - 5.0 Final    Non-HDL Cholesterol 08/07/2023 141  mg/dL Final    Comment: Risk category and Non-HDL cholesterol goals:  Coronary heart disease (CHD)or equivalent (10-year risk of CHD >20%):  Non-HDL cholesterol goal     <130 mg/dL  Two or more CHD risk factors and 10-year risk of CHD <= 20%:  Non-HDL cholesterol goal     <160 mg/dL  0 to 1 CHD risk factor:  Non-HDL cholesterol goal     <190 mg/dL      HIV 1/2 Ag/Ab 08/07/2023 Non-reactive  Non-reactive Final    Hepatitis C Ab 08/07/2023 Non-reactive  Non-reactive Final    Hemoglobin A1C 08/07/2023 5.1  4.0 - 5.6 % Final    Comment: ADA Screening Guidelines:  5.7-6.4%  Consistent with prediabetes  >or=6.5%  Consistent with diabetes    High levels of fetal hemoglobin interfere with the HbA1C  assay. Heterozygous hemoglobin variants (HbS, HgC, etc)do  not significantly interfere with this assay.   However, presence of multiple variants may affect accuracy.      Estimated Avg Glucose 08/07/2023 100  68 - 131 mg/dL Final    Sodium 08/07/2023 138  136 - 145 mmol/L Final    Potassium 08/07/2023 4.2  3.5 - 5.1 mmol/L Final    Chloride 08/07/2023 106  95 - 110 mmol/L Final    CO2 08/07/2023 23  23 - 29 mmol/L Final    Glucose 08/07/2023 83  70 - 110 mg/dL Final    BUN 08/07/2023 11  6 - 20 mg/dL Final    Creatinine 08/07/2023 0.8  0.5 - 1.4 mg/dL Final    Calcium 08/07/2023 9.0  8.7 - 10.5 mg/dL Final    Total Protein 08/07/2023 7.0  6.0 - 8.4 g/dL Final    Albumin 08/07/2023 3.8  3.5 - 5.2 g/dL Final    Total Bilirubin 08/07/2023 0.3  0.1 - 1.0 mg/dL Final    Comment: For infants and newborns, interpretation of results should be based  on gestational age, weight and in  agreement with clinical  observations.    Premature Infant recommended reference ranges:  Up to 24 hours.............<8.0 mg/dL  Up to 48 hours............<12.0 mg/dL  3-5 days..................<15.0 mg/dL  6-29 days.................<15.0 mg/dL      Alkaline Phosphatase 08/07/2023 47 (L)  55 - 135 U/L Final    AST 08/07/2023 21  10 - 40 U/L Final    ALT 08/07/2023 16  10 - 44 U/L Final    eGFR 08/07/2023 >60.0  >60 mL/min/1.73 m^2 Final    Anion Gap 08/07/2023 9  8 - 16 mmol/L Final    WBC 08/07/2023 4.91  3.90 - 12.70 K/uL Final    RBC 08/07/2023 4.34  4.00 - 5.40 M/uL Final    Hemoglobin 08/07/2023 12.6  12.0 - 16.0 g/dL Final    Hematocrit 08/07/2023 38.0  37.0 - 48.5 % Final    MCV 08/07/2023 88  82 - 98 fL Final    MCH 08/07/2023 29.0  27.0 - 31.0 pg Final    MCHC 08/07/2023 33.2  32.0 - 36.0 g/dL Final    RDW 08/07/2023 12.9  11.5 - 14.5 % Final    Platelets 08/07/2023 208  150 - 450 K/uL Final    MPV 08/07/2023 11.2  9.2 - 12.9 fL Final    Immature Granulocytes 08/07/2023 0.4  0.0 - 0.5 % Final    Gran # (ANC) 08/07/2023 1.7 (L)  1.8 - 7.7 K/uL Final    Immature Grans (Abs) 08/07/2023 0.02  0.00 - 0.04 K/uL Final    Comment: Mild elevation in immature granulocytes is non specific and   can be seen in a variety of conditions including stress response,   acute inflammation, trauma and pregnancy. Correlation with other   laboratory and clinical findings is essential.      Lymph # 08/07/2023 2.5  1.0 - 4.8 K/uL Final    Mono # 08/07/2023 0.3  0.3 - 1.0 K/uL Final    Eos # 08/07/2023 0.3  0.0 - 0.5 K/uL Final    Baso # 08/07/2023 0.05  0.00 - 0.20 K/uL Final    nRBC 08/07/2023 0  0 /100 WBC Final    Gran % 08/07/2023 34.7 (L)  38.0 - 73.0 % Final    Lymph % 08/07/2023 51.1 (H)  18.0 - 48.0 % Final    Mono % 08/07/2023 6.7  4.0 - 15.0 % Final    Eosinophil % 08/07/2023 6.1  0.0 - 8.0 % Final    Basophil % 08/07/2023 1.0  0.0 - 1.9 % Final    Differential Method 08/07/2023 Automated   Final        ASSESSMENT/PLAN:    1. Generalized anxiety disorder  Overview:  - I recommend evaluation by Psychiatry since her symptoms have been difficult to manage.  In the meantime she would like to increase her escitalopram 10 mg to 20 mg daily  -BuSpar and has not been effective and she does not want to try the higher dose  -she finds hydroxyzine has been helpful and she will continue it as needed.  -benzodiazepine as only for fear flying and not breakthrough anxiety or sleep issues    Orders:  -     EScitalopram oxalate (LEXAPRO) 20 MG tablet; Take 1 tablet (20 mg total) by mouth once daily.  Dispense: 90 tablet; Refill: 1  -     Ambulatory referral/consult to Psychiatry; Future; Expected date: 02/12/2024    2. Impaired concentration  Overview:  - difficulty completing tasks and impaired focus and concentration   -she has never had ADHD testing  -recommend Psychiatry for ADHD testing    Orders:  -     Ambulatory referral/consult to Psychiatry; Future; Expected date: 02/12/2024    3. Psychophysiological insomnia  Overview:  - counseling on sleep management   -okay to trial hydroxyzine at bedtime  -also recommend mindfulness exercises at bedtime    Orders:  -     hydrOXYzine HCL (ATARAX) 25 MG tablet; Take 1 tablet (25 mg total) by mouth nightly as needed for Itching.  Dispense: 30 tablet; Refill: 5    4. Fear of flying  Overview:  - no relief with buspirone or hydroxyzine   -okay to use alprazolam 0.5 mg only as needed for flights  -discuss chronic benzodiazepine use and she is aware that this is only for as needed prior to flights    Orders:  -     ALPRAZolam (XANAX) 0.5 MG tablet; Take 1 tablet (0.5 mg total) by mouth daily as needed for Anxiety (flight).  Dispense: 15 tablet; Refill: 1          ORDERS:   Orders Placed This Encounter    Ambulatory referral/consult to Psychiatry    EScitalopram oxalate (LEXAPRO) 20 MG tablet    ALPRAZolam (XANAX) 0.5 MG tablet    hydrOXYzine HCL (ATARAX) 25 MG tablet       Vaccines  recommended:  Flu and COVID-19    Follow-up in 1 month or sooner if any concerns.      This note is dictated using the M*Modal Fluency Direct word recognition program. There are word recognition mistakes that are occasionally missed on review.    Dr. Marimar Rojo D.O.   [facility]

## 2024-02-05 NOTE — PATIENT INSTRUCTIONS
I placed a referral to [facility] and you can call central scheduling at [phone] or go [email or web address] to schedule an appointment.

## 2024-02-22 RX ORDER — NORETHINDRONE AND ETHINYL ESTRADIOL 1 MG-35MCG
1 KIT ORAL
COMMUNITY
Start: 2023-12-21

## 2024-02-22 NOTE — TELEPHONE ENCOUNTER
Call Documentation    Person Called: Patient Call Time: 11:00a   Spoke with: Erma 02/22/2024        Reason for call: Clarification- dosage and frequency of medications listed below      Patient stated: She wants to stick with Nylia 1/35 ( that brand specifically)      Clarification details and Actions Taken: Pended Nylia 1/35 with detail notes for the provider to review     Current Medication Requested:   Requested Prescriptions     Pending Prescriptions Disp Refills    NYLIA 1/35, 28, 1-35 mg-mcg per tablet [Pharmacy Med Name: NYLIA 1-35 28 TABLET] 84 tablet 0     Sig: TAKE 1 TABLET BY MOUTH EVERY DAY      [facility]   Note composed: 02/22/2024 11:13 AM

## 2024-02-22 NOTE — TELEPHONE ENCOUNTER
Refill Routing Note   Medication(s) are not appropriate for processing by [facility] for the following reason(s):        Clarification of medication (Rx) details  Other( Last order was for 7/7/7 but patient is requesting to stay on the Nylia 1/35 [this brand specifically] )    OR action(s):  Defer        Medication Therapy Plan: Nylia 1/35 ( Ortho-Novum 1/35) was Discontinued by: Mandie Marsh MD on 4/4/2023 and changed to Ortho-Novum 7/7/7 which is different from what patient has been taking and currently requesting to stay on 1/35. Adherence data shows patient continued to kary 1/35 since those refills were still valid. Spoke to patient and she confirmed she wants Nylia 1/35    Medication reconciliation completed: Yes     Appointments  past 12m or future 3m with PCP    Date Provider   Last Visit   4/4/2023 Mandie Marsh MD   Next Visit   Visit date not found Mandie Marsh MD   ED visits in past 90 days: 0        Note composed:11:15 AM 02/22/2024

## 2024-02-23 RX ORDER — NORETHINDRONE AND ETHINYL ESTRADIOL 1 MG-35MCG
1 KIT ORAL
Qty: 84 TABLET | Refills: 0 | Status: SHIPPED | OUTPATIENT
Start: 2024-02-23 | End: 2024-05-17

## 2024-04-25 ENCOUNTER — PATIENT MESSAGE (OUTPATIENT)
Dept: OBSTETRICS AND GYNECOLOGY | Facility: CLINIC | Age: 29
End: 2024-04-25
Payer: COMMERCIAL

## 2024-04-28 ENCOUNTER — OFFICE VISIT (OUTPATIENT)
Dept: URGENT CARE | Facility: CLINIC | Age: 29
End: 2024-04-28
Payer: COMMERCIAL

## 2024-04-28 VITALS
SYSTOLIC BLOOD PRESSURE: 115 MMHG | RESPIRATION RATE: 20 BRPM | HEIGHT: 62 IN | OXYGEN SATURATION: 98 % | TEMPERATURE: 98 F | WEIGHT: 140 LBS | HEART RATE: 68 BPM | BODY MASS INDEX: 25.76 KG/M2 | DIASTOLIC BLOOD PRESSURE: 65 MMHG

## 2024-04-28 DIAGNOSIS — A60.04 HERPES SIMPLEX VIRUS (HSV) INFECTION OF VAGINA: ICD-10-CM

## 2024-04-28 DIAGNOSIS — R21 RASH: Primary | ICD-10-CM

## 2024-04-28 DIAGNOSIS — L01.00 IMPETIGO: ICD-10-CM

## 2024-04-28 PROCEDURE — 87529 HSV DNA AMP PROBE: CPT | Mod: 59 | Performed by: FAMILY MEDICINE

## 2024-04-28 PROCEDURE — 99213 OFFICE O/P EST LOW 20 MIN: CPT | Mod: S$GLB,,, | Performed by: FAMILY MEDICINE

## 2024-04-28 RX ORDER — SULFAMETHOXAZOLE AND TRIMETHOPRIM 800; 160 MG/1; MG/1
1 TABLET ORAL 2 TIMES DAILY
Qty: 14 TABLET | Refills: 0 | Status: SHIPPED | OUTPATIENT
Start: 2024-04-28 | End: 2024-05-05

## 2024-04-28 RX ORDER — VALACYCLOVIR HYDROCHLORIDE 1 G/1
1000 TABLET, FILM COATED ORAL EVERY 12 HOURS
Qty: 20 TABLET | Refills: 1 | Status: SHIPPED | OUTPATIENT
Start: 2024-04-28 | End: 2024-05-08

## 2024-04-28 NOTE — LETTER
April 28, 2024      [facility] [address] [address]  Phone: [phone]  Fax: [phone]       Patient: Erma Garland   YOB: 1995  Date of Visit: 04/28/2024    To Whom It May Concern:    Dawna Garland  was at [facility] on 04/28/2024. The patient may return to work/school on 4.30.24 with no restrictions. If you have any questions or concerns, or if I can be of further assistance, please do not hesitate to contact me.    Sincerely,    Rhonda Galaviz MD

## 2024-04-28 NOTE — PROGRESS NOTES
"Subjective:      Patient ID: Erma Garland is a 29 y.o. female.    Vitals:  height is 5' 2" (1.575 m) and weight is 63.5 kg (140 lb). Her oral temperature is 98.2 °F (36.8 °C). Her blood pressure is 115/65 and her pulse is 68. Her respiration is 20 and oxygen saturation is 98%.     Chief Complaint: Rash    Pt states that she is coming in for a rash on the inner thigh . Pt syms started 5 days. Pt self treated with OTC topical cream. Pt also says that 3 days ago it would burn with urinating. Pt pain level is a 8.      Rash  This is a new problem. Episode onset: 5 days ago. The problem has been rapidly worsening since onset. Location: inner thrigh. The rash is characterized by itchiness, redness, bruising and blistering. She was exposed to nothing. Past treatments include antibiotic cream. The treatment provided no relief.       Skin:  Positive for rash.      Objective:     Physical Exam   Constitutional: She is oriented to person, place, and time.   HENT:   Head: Normocephalic.   Ears:   Right Ear: External ear normal.   Left Ear: External ear normal.   Nose: Nose normal.   Mouth/Throat: Mucous membranes are moist.   Eyes: Conjunctivae are normal.   Cardiovascular: Normal rate.   Pulmonary/Chest: Effort normal.   Musculoskeletal: Normal range of motion.         General: Normal range of motion.   Neurological: She is alert and oriented to person, place, and time.   Skin: Skin is dry, rash, pustular and vesicular. lesion         Comments: painful   Psychiatric: Her behavior is normal.       Assessment:     1. Rash    2. Impetigo    3. 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 for 10 days  Dispense: 20 tablet; Refill: 1      RTC PRN   Results will be on your portal in appx 48-72 hours.   You are being covered for both HSV and a superimposed bacterial infection with antibiotics. IF this turns out to not be HSV then it is just a folliculitis that has gotten really bad but we are treating with antibiotics.

## 2024-04-28 NOTE — PATIENT INSTRUCTIONS
Results will be on your portal in appx 48-72 hours.   You are being covered for both HSV and a superimposed bacterial infection with antibiotics. IF this turns out to not be HSV then it is just a folliculitis that has gotten really bad but we are treating with antibiotics. Keep the area clean and dry. You can use vagisil with lidocaine to help with the pain and irritation.   If you sign up for access you will receive direct notification of your results.

## 2024-05-01 LAB
HSV1 DNA SPEC QL NAA+PROBE: POSITIVE
HSV2 DNA SPEC QL NAA+PROBE: NEGATIVE
SPECIMEN SOURCE: ABNORMAL

## 2024-05-08 ENCOUNTER — TELEPHONE (OUTPATIENT)
Dept: URGENT CARE | Facility: CLINIC | Age: 29
End: 2024-05-08
Payer: COMMERCIAL

## 2024-05-08 NOTE — TELEPHONE ENCOUNTER
Called patient. No answer. Left VM and callback number. Positive HSV1. Patient already treated with Valtrex during visit.

## 2024-05-17 RX ORDER — NORETHINDRONE AND ETHINYL ESTRADIOL 1 MG-35MCG
1 KIT ORAL
Qty: 84 TABLET | Refills: 0 | Status: SHIPPED | OUTPATIENT
Start: 2024-05-17

## 2024-05-17 NOTE — TELEPHONE ENCOUNTER
Refill Routing Note   Medication(s) are not appropriate for processing by [facility] for the following reason(s):        New or recently adjusted medication    [facility] action(s):  Defer        Medication Therapy Plan: New start since confirming which dose the patient is taking. FOVs in 2 months      Appointments  past 12m or future 3m with PCP    Date Provider   Last Visit   4/4/2023 Mandie Marsh MD   Next Visit   7/26/2024 Mandie Marsh MD   ED visits in past 90 days: 0        Note composed:1:47 PM 05/17/2024

## 2024-05-20 DIAGNOSIS — F51.04 PSYCHOPHYSIOLOGICAL INSOMNIA: ICD-10-CM

## 2024-05-20 RX ORDER — HYDROXYZINE HYDROCHLORIDE 25 MG/1
25 TABLET, FILM COATED ORAL NIGHTLY PRN
Qty: 30 TABLET | Refills: 5 | Status: SHIPPED | OUTPATIENT
Start: 2024-05-20

## 2024-05-20 NOTE — TELEPHONE ENCOUNTER
Refill Encounter    PCP Visits: Recent Visits  Date Type Provider Dept   02/05/24 Office Visit Marimar Rojo, DO [facility]   09/21/23 Office Visit Marimar Rojo, DO [facility]   08/02/23 Office Visit Marimar Rojo, DO [facility]   Showing recent visits within past 360 days and meeting all other requirements  Future Appointments  No visits were found meeting these conditions.  Showing future appointments within next 720 days and meeting all other requirements     Last 3 Blood Pressure:   BP Readings from Last 3 Encounters:   04/28/24 115/65   08/02/23 (!) 112/52   04/04/23 110/67     Preferred Pharmacy:   [facility]/pharmacy #[id number] - [place], [place] - [address]  [address]  [place] [address]  Phone: [phone] Fax: [phone]    [facility] [address] [place] [address]  Phone: [phone] Fax: [phone]    Requested RX:  Requested Prescriptions     Pending Prescriptions Disp Refills    hydrOXYzine HCL (ATARAX) 25 MG tablet 30 tablet 5     Sig: Take 1 tablet (25 mg total) by mouth nightly as needed for Itching.      RX Route: Normal

## 2024-06-19 ENCOUNTER — OFFICE VISIT (OUTPATIENT)
Dept: URGENT CARE | Facility: CLINIC | Age: 29
End: 2024-06-19
Payer: COMMERCIAL

## 2024-06-19 VITALS
HEART RATE: 68 BPM | SYSTOLIC BLOOD PRESSURE: 106 MMHG | RESPIRATION RATE: 20 BRPM | HEIGHT: 62 IN | DIASTOLIC BLOOD PRESSURE: 63 MMHG | BODY MASS INDEX: 26.82 KG/M2 | WEIGHT: 145.75 LBS | TEMPERATURE: 98 F | OXYGEN SATURATION: 97 %

## 2024-06-19 DIAGNOSIS — H66.002 NON-RECURRENT ACUTE SUPPURATIVE OTITIS MEDIA OF LEFT EAR WITHOUT SPONTANEOUS RUPTURE OF TYMPANIC MEMBRANE: Primary | ICD-10-CM

## 2024-06-19 PROCEDURE — 99213 OFFICE O/P EST LOW 20 MIN: CPT | Mod: S$GLB,,,

## 2024-06-19 RX ORDER — AMOXICILLIN AND CLAVULANATE POTASSIUM 875; 125 MG/1; MG/1
1 TABLET, FILM COATED ORAL EVERY 12 HOURS
Qty: 14 TABLET | Refills: 0 | Status: SHIPPED | OUTPATIENT
Start: 2024-06-19 | End: 2024-06-26

## 2024-06-19 NOTE — PATIENT INSTRUCTIONS
Start augmentin antibiotic and take as prescribed. Take full dose or symptoms will not get better. Take with food and water to decrease GI upset. Try a probiotic supplement or eat daily yogurt to maintain good gut and vaginal dani while on an antibiotic. Do not drink alcohol while taking an antibiotic.       Try a decongestant and corticosteroid nasal spray like flonase for the next few days for sinus relief. Initial: 2 sprays in each nostril once daily for 1 week. Reduce to 1 spray in each nostril once per day. Stop taking if you develop a nose bleed. Nasal saline spray can be used together with flonase to help moisten nostrils. An antihistamine like zyrtec, claritin, allegra can also be helpful for sinus relief and will help dry nasal passages.     Regular (Guaifenesin) Mucinex 1200 mg twice per day for 10 days can help thin secretions for better clearance. Drink plenty of fluids with this.     Honey is a natural cough suppressant.     -If you do NOT have high blood pressure, you may use a decongestant form (D)  of this medication (ie. Claritin- D, zyrtec-D, allegra-D, Mucinex-D) or if you do not take the D form, you can take sudafed (pseudoephedrine) over the counter, which is a powerful decongestant. Do NOT take two decongestant (D) medications at the same time (such as mucinex-D and claritin-D or plain sudafed and claritin D). Dextromethorphan (DM) is a cough suppressant over the counter (ie. mucinex DM, robitussin, delsym; dayquil/nyquil has DM as well.) Try Afrin (strong decongestant). Only use for 3 days as this can cause a rebound of congestion. Try cepacol for sore throat.     Warm tea/warm liquids will help soothe the back of your throat. Warm water salt gurgles can also be helpful. A dry throat will cause pain. Make sure to stay hydrated. Water and pedilyte are the best to drink. Neti pot irrigation, humidifier in your room, avoiding fans, warm compresses to face, eating/drinking hot soups, hot shower  before bedtime can help.     The recommended daily fluid intake for women is 2.7 liters (five 16 oz bottles).      Alternate Tylenol and ibuprofen every 4 hours as needed for fever and body aches.  Please take NSAIDs with a full glass of water and food to avoid GI upset.      Please only use over the counter cough and cold medications for 3-5 days at a time to avoid rebound symptoms.     Getting plenty of rest can aid in a faster recovery of illnesses.     Please follow-up with your primary care provider or return to the clinic if not better/worsening symptoms in 1 week.     Report to the ER if you have chest pain, shortness of breath, palpitations.
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
## 2024-07-26 ENCOUNTER — LAB VISIT (OUTPATIENT)
Dept: LAB | Facility: HOSPITAL | Age: 29
End: 2024-07-26
Attending: OBSTETRICS & GYNECOLOGY
Payer: COMMERCIAL

## 2024-07-26 ENCOUNTER — OFFICE VISIT (OUTPATIENT)
Dept: OBSTETRICS AND GYNECOLOGY | Facility: CLINIC | Age: 29
End: 2024-07-26
Payer: COMMERCIAL

## 2024-07-26 VITALS
HEIGHT: 62 IN | BODY MASS INDEX: 27.1 KG/M2 | DIASTOLIC BLOOD PRESSURE: 70 MMHG | SYSTOLIC BLOOD PRESSURE: 120 MMHG | WEIGHT: 147.25 LBS

## 2024-07-26 DIAGNOSIS — A60.00 GENITAL HERPES SIMPLEX, UNSPECIFIED SITE: ICD-10-CM

## 2024-07-26 DIAGNOSIS — Z11.3 SCREEN FOR STD (SEXUALLY TRANSMITTED DISEASE): ICD-10-CM

## 2024-07-26 DIAGNOSIS — Z01.419 WELL WOMAN EXAM WITH ROUTINE GYNECOLOGICAL EXAM: Primary | ICD-10-CM

## 2024-07-26 LAB
HAV IGM SERPL QL IA: NORMAL
HBV CORE IGM SERPL QL IA: NORMAL
HBV SURFACE AG SERPL QL IA: NORMAL
HCV AB SERPL QL IA: NORMAL
HIV 1+2 AB+HIV1 P24 AG SERPL QL IA: NORMAL
TREPONEMA PALLIDUM IGG+IGM AB [PRESENCE] IN SERUM OR PLASMA BY IMMUNOASSAY: NONREACTIVE

## 2024-07-26 PROCEDURE — 80074 ACUTE HEPATITIS PANEL: CPT | Performed by: OBSTETRICS & GYNECOLOGY

## 2024-07-26 PROCEDURE — 36415 COLL VENOUS BLD VENIPUNCTURE: CPT | Performed by: OBSTETRICS & GYNECOLOGY

## 2024-07-26 PROCEDURE — 87389 HIV-1 AG W/HIV-1&-2 AB AG IA: CPT | Performed by: OBSTETRICS & GYNECOLOGY

## 2024-07-26 PROCEDURE — 86593 SYPHILIS TEST NON-TREP QUANT: CPT | Performed by: OBSTETRICS & GYNECOLOGY

## 2024-07-26 PROCEDURE — 87591 N.GONORRHOEAE DNA AMP PROB: CPT | Performed by: OBSTETRICS & GYNECOLOGY

## 2024-07-26 PROCEDURE — 87491 CHLMYD TRACH DNA AMP PROBE: CPT | Performed by: OBSTETRICS & GYNECOLOGY

## 2024-07-26 PROCEDURE — 99999 PR PBB SHADOW E&M-EST. PATIENT-LVL III: CPT | Mod: PBBFAC,,, | Performed by: OBSTETRICS & GYNECOLOGY

## 2024-07-26 RX ORDER — NORETHINDRONE AND ETHINYL ESTRADIOL 1 MG-35MCG
1 KIT ORAL DAILY
Qty: 84 TABLET | Refills: 3 | Status: SHIPPED | OUTPATIENT
Start: 2024-07-26

## 2024-07-26 RX ORDER — VALACYCLOVIR HYDROCHLORIDE 500 MG/1
500 TABLET, FILM COATED ORAL 2 TIMES DAILY
Qty: 30 TABLET | Refills: 3 | Status: SHIPPED | OUTPATIENT
Start: 2024-07-26 | End: 2024-07-29

## 2024-07-26 NOTE — PROGRESS NOTES
History & Physical  Gynecology      SUBJECTIVE:     Chief Complaint: Well Woman       History of Present Illness:  Annual Exam-Premenopausal  Patient presents for annual exam. The patient has no complaints today. Menstrual cycles are monthly.  The patient is sexually active. GYN screening history: last pap: approximate date 3/23/2022 neg paphpv  and was normal. The patient participates in regular exercise: yes.  Smoking status:  no cigarettes.    Contraception: OCP    FH:  Breast cancer: neg  Colon cancer: neg  Ovarian and Uterine cancer: mother, MSH2 mutation- Saravia    Review of patient's allergies indicates:   Allergen Reactions    Latex, natural rubber Itching and Swelling    Doxycycline      Hives from sun    Cat dander Itching     rhinitis       Past Medical History:   Diagnosis Date    Acne     Anxiety     Depression     Saravia syndrome      Past Surgical History:   Procedure Laterality Date    COLON SURGERY  2022    Colonoscopy    COLONOSCOPY N/A 2022    Procedure: COLONOSCOPY;  Surgeon: Zara Barbosa MD;  Location: [address];  Service: Endoscopy;  Laterality: N/A;  fully vaccinated    WISDOM TOOTH EXTRACTION       OB History          0    Para   0    Term   0       0    AB   0    Living   0         SAB   0    IAB   0    Ectopic   0    Multiple   0    Live Births   0               Family History   Problem Relation Name Age of Onset    Ovarian cancer Mother Dulce         s/p hys    Endometrial cancer Mother Dulce     Cancer Mother Dulce     Depression Mother Dulce     Mental illness Mother Dulce     Heart disease Father          unknown specific    No Known Problems Sister      Cancer Maternal Aunt Adela         thyroid cancer    Cancer Maternal Grandfather Umang         brain cancer    Early death Maternal Grandfather Luby     Cancer Paternal Grandmother Dana         unknown    Melanoma Neg Hx      Breast cancer Neg Hx      Colon cancer Neg Hx       Social History     Tobacco  Use    Smoking status: Never    Smokeless tobacco: Never   Substance Use Topics    Alcohol use: Yes     Alcohol/week: 6.0 standard drinks of alcohol     Types: 2 Glasses of wine, 4 Drinks containing 0.5 oz of alcohol per week     Comment: 3-5 drinks weekly     Drug use: No       Current Outpatient Medications   Medication Sig    ALPRAZolam (XANAX) 0.5 MG tablet Take 1 tablet (0.5 mg total) by mouth daily as needed for Anxiety (flight).    EScitalopram oxalate (LEXAPRO) 20 MG tablet Take 1 tablet (20 mg total) by mouth once daily.    hydrOXYzine HCL (ATARAX) 25 MG tablet Take 1 tablet (25 mg total) by mouth nightly as needed for Itching.    NYLIA 1/35, 28, 1-35 mg-mcg per tablet Take 1 tablet by mouth.    valACYclovir (VALTREX) 1000 MG tablet Take 1 tablet (1,000 mg total) by mouth every 12 (twelve) hours. for 10 days    norethindrone-ethinyl estradiol (NYLIA 1/35, 28,) 1-35 mg-mcg per tablet Take 1 tablet by mouth once daily.    valACYclovir (VALTREX) 500 MG tablet Take 1 tablet (500 mg total) by mouth 2 (two) times daily. for 3 days     No current facility-administered medications for this visit.         Review of Systems:  Review of Systems   Constitutional:  Negative for appetite change, fever and unexpected weight change.   Respiratory:  Negative for shortness of breath.    Cardiovascular:  Negative for chest pain.   Gastrointestinal:  Negative for nausea and vomiting.   Genitourinary:  Negative for menorrhagia, menstrual problem, pelvic pain, vaginal bleeding, vaginal discharge and vaginal pain.   Integumentary:  Negative for breast mass.   Breast: Negative for lump and mass       OBJECTIVE:     Physical Exam:  Physical Exam  Vitals and nursing note reviewed.   Constitutional:       Appearance: She is well-developed.   Neck:      Thyroid: No thyromegaly.      Trachea: No tracheal deviation.   Cardiovascular:      Rate and Rhythm: Normal rate and regular rhythm.      Heart sounds: Normal heart sounds.    Pulmonary:      Effort: Pulmonary effort is normal.      Breath sounds: Normal breath sounds.   Chest:   Breasts:     Breasts are symmetrical.      Right: No inverted nipple, mass, nipple discharge, skin change or tenderness.      Left: No inverted nipple, mass, nipple discharge, skin change or tenderness.   Abdominal:      Palpations: Abdomen is soft.   Genitourinary:     General: Normal vulva.      Labia:         Right: No rash, tenderness, lesion or injury.         Left: No rash, tenderness, lesion or injury.       Urethra: No prolapse, urethral pain, urethral swelling or urethral lesion.      Vagina: Normal. No signs of injury and foreign body. No vaginal discharge, erythema, tenderness or bleeding.      Cervix: No cervical motion tenderness, discharge or friability.      Uterus: Not deviated, not enlarged, not fixed and not tender.       Adnexa:         Right: No mass, tenderness or fullness.          Left: No mass, tenderness or fullness.        Rectum: No anal fissure or external hemorrhoid.      Comments: Urethral meatus: normal size, anterior vaginal wall with no prolapse, no lesions  Bladder: no fullness, masses or tenderness  Musculoskeletal:      Cervical back: Normal range of motion and neck supple.   Neurological:      Mental Status: She is alert and oriented to person, place, and time.   Psychiatric:         Behavior: Behavior normal.         Thought Content: Thought content normal.         Judgment: Judgment normal.         Chaperoned by: declined chaperone    ASSESSMENT:       ICD-10-CM ICD-9-CM    1. Well woman exam with routine gynecological exam  Z01.419 V72.31       2. Genital herpes simplex, unspecified site  A60.00 054.10 valACYclovir (VALTREX) 500 MG tablet      3. Screen for STD (sexually transmitted disease)  Z11.3 V74.5 HIV 1/2 Ag/Ab (4th Gen)      Hepatitis Panel, Acute      Treponema Pallidium Antibodies IgG, IgM      C. trachomatis/N. gonorrhoeae by AMP DNA                 Plan:       Erma was seen today for well woman.    Diagnoses and all orders for this visit:    Well woman exam with routine gynecological exam    Genital herpes simplex, unspecified site  -     valACYclovir (VALTREX) 500 MG tablet; Take 1 tablet (500 mg total) by mouth 2 (two) times daily. for 3 days    Screen for STD (sexually transmitted disease)  -     HIV 1/2 Ag/Ab (4th Gen); Future  -     Hepatitis Panel, Acute; Future  -     Treponema Pallidium Antibodies IgG, IgM; Future  -     C. trachomatis/N. gonorrhoeae by AMP DNA    Other orders  -     norethindrone-ethinyl estradiol (NYLIA 1/35, 28,) 1-35 mg-mcg per tablet; Take 1 tablet by mouth once daily.            Orders Placed This Encounter   Procedures    HIV 1/2 Ag/Ab (4th Gen)    Hepatitis Panel, Acute    Treponema Pallidium Antibodies IgG, IgM    C. trachomatis/N. gonorrhoeae by AMP DNA       Well Woman:  - Pap smear up to date  - Birth control: refilled- considering IUD one day placed with colonoscopy  - GC/CT:done today  - Mammogram: due age 41  - Smoking cessation: n/a  - Labs: none required   - Vaccines: s/p hpv  - does not want ultrasound and Ca 125 today    Herpes:  - valtrex prescribed with instructions on use      Saravia:  - Plan for annual visits; -consider EMB at age 31; has had first colonoscopy, has already met with Breast surgery, s/p TVUS and Ca 125;     Dr. 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 The estimated endometrial cancer risk by age 40 years in women with Saravia syndrome is approximately 2-4%, and the estimated ovarian cancer risk is approximately 1-2%; by age 50 years, this risk increases to 8-17% and 3-7%, respectively , for this reason RR Hyst/BSO is recommended age 40-45.  - Gyn Screening recommendations until definitive surgery include:        - Endometrial biopsy every 1-2 years, beginning at age 30       - Keeping a menstrual calendar and evaluating abnormal uterine bleeding.    - Available screening  strategies have a limited ability to detect ovarian cancer at an early, more curable stage of disease, and there is no evidence that screening has reduced the mortality or improved the survival associated with ovarian cancer in high-risk populations.  Nevertheless,  screening is reasonable with TV US and  yearly if she would like it. She declines.   - For  now, she can continue routine gynecologic care with her established OBGYN with annual pelvic exam and pap smears per guidelines. She can see me yearly to check in. I encouraged her to explore [employer] website for support. She was given the opportunity to ask questions, and stated all had been answered.    Follow up in  one year for annual or prn.    Mandie Marsh

## 2024-07-27 LAB
C TRACH DNA SPEC QL NAA+PROBE: NOT DETECTED
N GONORRHOEA DNA SPEC QL NAA+PROBE: NOT DETECTED

## 2024-08-15 ENCOUNTER — LAB VISIT (OUTPATIENT)
Dept: LAB | Facility: OTHER | Age: 29
End: 2024-08-15
Attending: INTERNAL MEDICINE
Payer: COMMERCIAL

## 2024-08-15 DIAGNOSIS — K59.04 CHRONIC IDIOPATHIC CONSTIPATION: ICD-10-CM

## 2024-08-15 DIAGNOSIS — Z12.11 SPECIAL SCREENING FOR MALIGNANT NEOPLASMS, COLON: ICD-10-CM

## 2024-08-15 DIAGNOSIS — R14.0 ABDOMINAL BLOATING: Primary | ICD-10-CM

## 2024-08-15 LAB
IGA SERPL-MCNC: 154 MG/DL (ref 40–350)
IGG SERPL-MCNC: 1289 MG/DL (ref 650–1600)
IGM SERPL-MCNC: 99 MG/DL (ref 50–300)

## 2024-08-15 PROCEDURE — 86364 TISS TRNSGLTMNASE EA IG CLAS: CPT | Performed by: INTERNAL MEDICINE

## 2024-08-15 PROCEDURE — 82784 ASSAY IGA/IGD/IGG/IGM EACH: CPT | Mod: 59 | Performed by: INTERNAL MEDICINE

## 2024-08-19 LAB — TTG IGA SER-ACNC: 0.4 U/ML

## 2024-08-20 DIAGNOSIS — F41.1 GENERALIZED ANXIETY DISORDER: ICD-10-CM

## 2024-08-20 RX ORDER — ESCITALOPRAM OXALATE 20 MG/1
20 TABLET ORAL
Qty: 90 TABLET | Refills: 1 | Status: SHIPPED | OUTPATIENT
Start: 2024-08-20

## 2024-08-20 NOTE — TELEPHONE ENCOUNTER
No care due was identified.  [facility] Embedded Care Due Messages. Reference number: [id number].   8/20/2024 12:17:36 AM CDT

## 2024-10-26 DIAGNOSIS — F41.1 GENERALIZED ANXIETY DISORDER: ICD-10-CM

## 2024-10-27 RX ORDER — ESCITALOPRAM OXALATE 10 MG/1
10 TABLET ORAL
Qty: 90 TABLET | Refills: 3 | OUTPATIENT
Start: 2024-10-27

## 2024-11-22 RX ORDER — NORETHINDRONE AND ETHINYL ESTRADIOL 1 MG-35MCG
1 KIT ORAL DAILY
Qty: 84 TABLET | Refills: 2 | Status: SHIPPED | OUTPATIENT
Start: 2024-11-22

## 2024-11-23 NOTE — TELEPHONE ENCOUNTER
Refill Decision Note   Erma Garland  is requesting a refill authorization.  Brief Assessment and Rationale for Refill:  Approve     Medication Therapy Plan:         Comments:     Note composed:9:20 PM 11/22/2024

## 2025-02-13 DIAGNOSIS — A60.00 GENITAL HERPES SIMPLEX, UNSPECIFIED SITE: ICD-10-CM

## 2025-02-13 RX ORDER — VALACYCLOVIR HYDROCHLORIDE 500 MG/1
500 TABLET, FILM COATED ORAL 2 TIMES DAILY
Qty: 30 TABLET | Refills: 3 | Status: SHIPPED | OUTPATIENT
Start: 2025-02-13

## 2025-02-14 ENCOUNTER — PATIENT MESSAGE (OUTPATIENT)
Dept: ADMINISTRATIVE | Facility: HOSPITAL | Age: 30
End: 2025-02-14
Payer: COMMERCIAL

## 2025-02-17 ENCOUNTER — TELEPHONE (OUTPATIENT)
Dept: OBSTETRICS AND GYNECOLOGY | Facility: CLINIC | Age: 30
End: 2025-02-17
Payer: COMMERCIAL

## 2025-02-17 ENCOUNTER — PATIENT OUTREACH (OUTPATIENT)
Dept: ADMINISTRATIVE | Facility: HOSPITAL | Age: 30
End: 2025-02-17
Payer: COMMERCIAL

## 2025-02-17 DIAGNOSIS — Z12.4 SCREENING FOR CERVICAL CANCER: Primary | ICD-10-CM

## 2025-02-17 NOTE — TELEPHONE ENCOUNTER
----- Message from Ngozi sent at 2/17/2025 10:01 AM CST -----  Pt wanted to speak with someone in the office about a medication she normally get from Dr. Marsh when she flies she said it was generic for Xanex she go with [facility] [address]. .[phone] she can be reached at [phone]

## 2025-02-19 ENCOUNTER — PATIENT MESSAGE (OUTPATIENT)
Dept: PRIMARY CARE CLINIC | Facility: CLINIC | Age: 30
End: 2025-02-19
Payer: COMMERCIAL

## 2025-02-19 DIAGNOSIS — F40.243 FEAR OF FLYING: ICD-10-CM

## 2025-02-19 RX ORDER — ALPRAZOLAM 0.5 MG/1
0.5 TABLET ORAL DAILY PRN
Qty: 15 TABLET | Refills: 1 | Status: CANCELLED | OUTPATIENT
Start: 2025-02-19

## 2025-02-19 NOTE — TELEPHONE ENCOUNTER
Her alprazolam is a controlled medicine that requires an appointment every 6 months for refills.  Okay to offer her a virtual visit ASAP with me or Daisy so that she can get it filled

## 2025-02-20 ENCOUNTER — OFFICE VISIT (OUTPATIENT)
Dept: PRIMARY CARE CLINIC | Facility: CLINIC | Age: 30
End: 2025-02-20
Payer: COMMERCIAL

## 2025-02-20 DIAGNOSIS — F41.1 GENERALIZED ANXIETY DISORDER: ICD-10-CM

## 2025-02-20 DIAGNOSIS — F40.243 FEAR OF FLYING: Primary | ICD-10-CM

## 2025-02-20 RX ORDER — ALPRAZOLAM 0.5 MG/1
0.5 TABLET ORAL DAILY PRN
Qty: 30 TABLET | Refills: 0 | Status: SHIPPED | OUTPATIENT
Start: 2025-02-20
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
## 2025-02-20 NOTE — PROGRESS NOTES
VIRTUAL VISIT/TELEMEDICINE VISIT  FAMILY MEDICINE  [facility]    The patient location is: [place]  The chief complaint leading to consultation is:   Chief Complaint   Patient presents with    Medication Refill    Follow-up     Visit type: Virtual visit with synchronous audio and video   Total time spent: 25 minute  Medical decision making time spent: 21 minute  Each patient to whom he or she provides medical services by telemedicine is:  (1) informed of the relationship between the physician and patient and the respective role of any other health care provider with respect to management of the patient; and (2) notified that he or she may decline to receive medical services by telemedicine and may withdraw from such care at any time.    HPI: Erma Garland is a 29 y.o. female presenting today for medication refill.    Patient is an established patient of PCP, Dr. Marimar Rojo DO. This patient is new to me.    She reports increased anxiety recently secondary to upcoming international travel. She has a fear of flying that has worsened due to the news of recent air travel disasters. She currently takes escitalopram 10 mg and is considering increasing the dose back to 20 mg. She feels this medication works very well for her as a daily maintenance medication for anxiety. She uses alprazolam as needed for flight-related anxiety, particularly during overseas flights. Her last alprazolam prescription was for 15 tablets one year ago and was very helpful however she is in need of more as she is preparing for an extended international trip.        Answers submitted by the patient for this visit:  Review of Systems Questionnaire (Submitted on 2/20/2025)  activity change: No  unexpected weight change: No  rhinorrhea: No  trouble swallowing: No  visual disturbance: No  chest tightness: No  polyuria: No  difficulty urinating: No  menstrual problem: No  joint swelling: No  arthralgias: No  confusion:  No  dysphoric mood: No    Review of Systems   HENT:  Negative for hearing loss.    Eyes:  Negative for discharge.   Respiratory:  Negative for wheezing.    Cardiovascular:  Negative for chest pain and palpitations.   Gastrointestinal:  Negative for blood in stool, constipation, diarrhea and vomiting.   Genitourinary:  Negative for dysuria and hematuria.   Musculoskeletal:  Negative for neck pain.   Neurological:  Negative for weakness and headaches.   Endo/Heme/Allergies:  Negative for polydipsia.     ALLERGIES:   Review of patient's allergies indicates:   Allergen Reactions    Latex, natural rubber Itching and Swelling    Doxycycline      Hives from sun    Cat dander Itching     rhinitis       MEDS:   Current Outpatient Medications on File Prior to Visit   Medication Sig Dispense Refill Last Dose/Taking    EScitalopram oxalate (LEXAPRO) 20 MG tablet TAKE 1 TABLET BY MOUTH EVERY DAY 90 tablet 1 Taking    hydrOXYzine HCL (ATARAX) 25 MG tablet Take 1 tablet (25 mg total) by mouth nightly as needed for Itching. 30 tablet 5 Taking As Needed    norethindrone-ethinyl estradiol (NYLIA 1/35, 28,) 1-35 mg-mcg per tablet Take 1 tablet by mouth once daily. 84 tablet 2 Taking    NYLIA 1/35, 28, 1-35 mg-mcg per tablet Take 1 tablet by mouth.   Taking    valACYclovir (VALTREX) 500 MG tablet Take 1 tablet (500 mg total) by mouth 2 (two) times daily. 30 tablet 3 Taking    [DISCONTINUED] ALPRAZolam (XANAX) 0.5 MG tablet Take 1 tablet (0.5 mg total) by mouth daily as needed for Anxiety (flight). 15 tablet 1 Taking As Needed       Vital signs:   There were no vitals filed for this visit.  There is no height or weight on file to calculate BMI.    PHYSICAL EXAM:     Physical Exam  Nursing note reviewed.   Constitutional:       Appearance: Normal appearance. She is not ill-appearing or diaphoretic.   HENT:      Head: Normocephalic and atraumatic.   Cardiovascular:      Rate and Rhythm: Normal rate.   Pulmonary:      Effort: Pulmonary  effort is normal. No respiratory distress.   Skin:     Coloration: Skin is not pale.   Neurological:      Mental Status: She is alert and oriented to person, place, and time.   Psychiatric:         Mood and Affect: Mood normal.         Behavior: Behavior normal.         Thought Content: Thought content normal.         Judgment: Judgment normal.           PERTINENT RESULTS:   No visits with results within 1 Week(s) from this visit.   Latest known visit with results is:   Lab Visit on 08/15/2024   Component Date Value Ref Range Status    IgG 08/15/2024 1289  650 - 1600 mg/dL Final    IgG Cord Blood Reference Range: 650-1600 mg/dL.    IgA 08/15/2024 154  40 - 350 mg/dL Final    IgA Cord Blood Reference Range: <5 mg/dL.    IgM 08/15/2024 99  50 - 300 mg/dL Final    IgM Cord Blood Reference Range: <25 mg/dL.    TTG IgA 08/15/2024 0.4  <7.0 U/mL Final    Comment: INTERPRETATION: Negative    Test performed at [facility],  [address], [place] [address]     [phone]  Nica Olvera MD, PhD - Medical Director         ASSESSMENT/PLAN:    1. Fear of flying  Overview:  - no relief with buspirone or hydroxyzine   - okay to use alprazolam 0.5 mg only as needed for flights  - discussed importance of avoiding chronic benzodiazepine use and she is aware that this is only for as needed prior to travel    Assessment & Plan:  - anxiety has heightened surrounding recent air travel disasters in the   - she is preparing for 2 weeks of international travel and already feels anxious  - last filled one year ago and we discussed importance of infrequent use only as needed for air travel  - she does have more travel upcoming later this year    Orders:  -     ALPRAZolam (XANAX) 0.5 MG tablet; Take 1 tablet (0.5 mg total) by mouth daily as needed for Anxiety (flight).  Dispense: 30 tablet; Refill: 0    2. Generalized anxiety disorder  Overview:  -she is currently taking escitalopram 10 mg daily (splitting her 20  mg tablet) but is considering increasing her dose back to 20 mg due to increased stressors particularly related to anxiety about upcoming travel  -BuSpar and has not been effective and she no longer takes this  -she finds hydroxyzine has been helpful and she will continue it as needed  -benzodiazepine as only for fear of flying and not breakthrough anxiety or sleep issues  -offered referral to psychiatry for counseling however she declines at this time      Health maintenance addressed: PAP upcoming and confirmed appointment is scheduled  Vaccines recommended: Influenza and Covid-19 updated    Follow up in about 6 months (around 8/20/2025) for annual visit with PCP.     LUISANA AlmodovarP-C   Internal Medicine

## 2025-04-07 ENCOUNTER — PATIENT OUTREACH (OUTPATIENT)
Dept: ADMINISTRATIVE | Facility: HOSPITAL | Age: 30
End: 2025-04-07
Payer: COMMERCIAL

## 2025-04-07 LAB — CRC RECOMMENDATION EXT: NORMAL

## 2025-04-08 ENCOUNTER — PATIENT MESSAGE (OUTPATIENT)
Dept: OBSTETRICS AND GYNECOLOGY | Facility: CLINIC | Age: 30
End: 2025-04-08
Payer: COMMERCIAL

## 2025-04-09 ENCOUNTER — OFFICE VISIT (OUTPATIENT)
Dept: OBSTETRICS AND GYNECOLOGY | Facility: CLINIC | Age: 30
End: 2025-04-09
Payer: COMMERCIAL

## 2025-04-09 VITALS
BODY MASS INDEX: 26.53 KG/M2 | HEART RATE: 70 BPM | HEIGHT: 62 IN | DIASTOLIC BLOOD PRESSURE: 63 MMHG | WEIGHT: 144.19 LBS | SYSTOLIC BLOOD PRESSURE: 121 MMHG

## 2025-04-09 DIAGNOSIS — Z01.419 WELL WOMAN EXAM WITH ROUTINE GYNECOLOGICAL EXAM: Primary | ICD-10-CM

## 2025-04-09 DIAGNOSIS — Z12.4 SCREENING FOR CERVICAL CANCER: ICD-10-CM

## 2025-04-09 DIAGNOSIS — Z15.09 LYNCH SYNDROME: ICD-10-CM

## 2025-04-09 DIAGNOSIS — Z11.3 SCREEN FOR STD (SEXUALLY TRANSMITTED DISEASE): ICD-10-CM

## 2025-04-09 PROCEDURE — 99999 PR PBB SHADOW E&M-EST. PATIENT-LVL IV: CPT | Mod: PBBFAC,,, | Performed by: OBSTETRICS & GYNECOLOGY

## 2025-04-09 PROCEDURE — 87591 N.GONORRHOEAE DNA AMP PROB: CPT | Performed by: OBSTETRICS & GYNECOLOGY

## 2025-04-09 RX ORDER — NORETHINDRONE AND ETHINYL ESTRADIOL 1 MG-35MCG
1 KIT ORAL DAILY
Qty: 84 TABLET | Refills: 3 | Status: SHIPPED | OUTPATIENT
Start: 2025-04-09

## 2025-04-09 NOTE — PROGRESS NOTES
History & Physical  Gynecology      SUBJECTIVE:     Chief Complaint: Well Woman       History of Present Illness:  Annual Exam-Premenopausal  Patient presents for annual exam. The patient has no complaints today. She is struggling with depression.  She had reduced her lexapro by half.  Is not currently seeing  a therapist.  Given list of names. Menstrual cycles are monthly.  The patient is not currently sexually active. GYN screening history: last pap: approximate date 3/23/2022 neg paphpv  and was normal. The patient participates in regular exercise: yes.  Smoking status:  no cigarettes.    Contraception: OCP    FH:  Breast cancer: neg  Colon cancer: neg  Ovarian and Uterine cancer: mother, MSH2 mutation- Saravia    Review of patient's allergies indicates:   Allergen Reactions    Latex, natural rubber Itching and Swelling    Doxycycline      Hives from sun    Cat dander Itching     rhinitis       Past Medical History:   Diagnosis Date    Acne     Anxiety     Depression     Saravia syndrome      Past Surgical History:   Procedure Laterality Date    COLON SURGERY  2022    Colonoscopy    COLONOSCOPY N/A 2022    Procedure: COLONOSCOPY;  Surgeon: Zara Barbosa MD;  Location: [address];  Service: Endoscopy;  Laterality: N/A;  fully vaccinated    COLONOSCOPY  2025    WISDOM TOOTH EXTRACTION       OB History          0    Para   0    Term   0       0    AB   0    Living   0         SAB   0    IAB   0    Ectopic   0    Multiple   0    Live Births   0               Family History   Problem Relation Name Age of Onset    Ovarian cancer Mother Dulce         s/p hys    Endometrial cancer Mother Dulce     Cancer Mother Dulce     Depression Mother Dulce     Mental illness Mother Dulce     Heart disease Father          unknown specific    No Known Problems Sister      Cancer Maternal Aunt Adela         thyroid cancer    Cancer Maternal Grandfather Umang         brain cancer    Early death Maternal  Grandfather Aliney     Cancer Paternal Grandmother Dana         unknown    Melanoma Neg Hx      Breast cancer Neg Hx      Colon cancer Neg Hx       Social History     Tobacco Use    Smoking status: Never    Smokeless tobacco: Never   Substance Use Topics    Alcohol use: Yes     Alcohol/week: 6.0 standard drinks of alcohol     Types: 2 Glasses of wine, 4 Drinks containing 0.5 oz of alcohol per week     Comment: every other weekend    Drug use: Yes     Types: Marijuana       Current Outpatient Medications   Medication Sig    ALPRAZolam (XANAX) 0.5 MG tablet Take 1 tablet (0.5 mg total) by mouth daily as needed for Anxiety (flight).    EScitalopram oxalate (LEXAPRO) 20 MG tablet TAKE 1 TABLET BY MOUTH EVERY DAY    hydrOXYzine HCL (ATARAX) 25 MG tablet Take 1 tablet (25 mg total) by mouth nightly as needed for Itching.    NYLIA 1/35, 28, 1-35 mg-mcg per tablet Take 1 tablet by mouth.    valACYclovir (VALTREX) 500 MG tablet Take 1 tablet (500 mg total) by mouth 2 (two) times daily.    norethindrone-ethinyl estradiol (NYLIA 1/35, 28,) 1-35 mg-mcg per tablet Take 1 tablet by mouth once daily.     No current facility-administered medications for this visit.         Review of Systems:  Review of Systems   Constitutional:  Negative for appetite change, fever and unexpected weight change.   Respiratory:  Negative for shortness of breath.    Cardiovascular:  Negative for chest pain.   Gastrointestinal:  Negative for nausea and vomiting.   Genitourinary:  Negative for menorrhagia, menstrual problem, pelvic pain, vaginal bleeding, vaginal discharge and vaginal pain.   Integumentary:  Negative for breast mass.   Breast: Negative for lump and mass       OBJECTIVE:     Physical Exam:  Physical Exam  Vitals and nursing note reviewed.   Constitutional:       Appearance: She is well-developed.   Neck:      Thyroid: No thyromegaly.      Trachea: No tracheal deviation.   Cardiovascular:      Rate and Rhythm: Normal rate and regular rhythm.       Heart sounds: Normal heart sounds.   Pulmonary:      Effort: Pulmonary effort is normal.      Breath sounds: Normal breath sounds.   Chest:   Breasts:     Breasts are symmetrical.      Right: No inverted nipple, mass, nipple discharge, skin change or tenderness.      Left: No inverted nipple, mass, nipple discharge, skin change or tenderness.   Abdominal:      Palpations: Abdomen is soft.   Genitourinary:     General: Normal vulva.      Labia:         Right: No rash, tenderness, lesion or injury.         Left: No rash, tenderness, lesion or injury.       Urethra: No prolapse, urethral pain, urethral swelling or urethral lesion.      Vagina: Normal. No signs of injury and foreign body. No vaginal discharge, erythema, tenderness or bleeding.      Cervix: No cervical motion tenderness, discharge or friability.      Uterus: Not deviated, not enlarged, not fixed and not tender.       Adnexa:         Right: No mass, tenderness or fullness.          Left: No mass, tenderness or fullness.        Rectum: No anal fissure or external hemorrhoid.      Comments: Urethral meatus: normal size, anterior vaginal wall with no prolapse, no lesions  Bladder: no fullness, masses or tenderness  Musculoskeletal:      Cervical back: Normal range of motion and neck supple.   Neurological:      Mental Status: She is alert and oriented to person, place, and time.   Psychiatric:         Behavior: Behavior normal.         Thought Content: Thought content normal.         Judgment: Judgment normal.         Chaperoned by: declined chaperone    ASSESSMENT:       ICD-10-CM ICD-9-CM    1. Well woman exam with routine gynecological exam  Z01.419 V72.31       2. Screening for cervical cancer  Z12.4 V76.2 Ambulatory referral/consult to Obstetrics / Gynecology      Liquid-Based Pap Smear, Screening      3. Screen for STD (sexually transmitted disease)  Z11.3 V74.5 HIV 1/2 Ag/Ab (4th Gen)      Hepatitis Panel, Acute      Treponema Pallidium  Antibodies IgG, IgM      C. trachomatis/N. 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    Diagnoses and all orders for this visit:    Well woman exam with routine gynecological exam    Screening for cervical cancer  -     Ambulatory referral/consult to Obstetrics / Gynecology  -     Liquid-Based Pap Smear, Screening    Screen for STD (sexually transmitted disease)  -     HIV 1/2 Ag/Ab (4th Gen); Future  -     Hepatitis Panel, Acute; Future  -     Treponema Pallidium Antibodies IgG, IgM; Future  -     C. trachomatis/N. gonorrhoeae by AMP DNA    Saravia syndrome  -     ; Future    Other orders  -     norethindrone-ethinyl estradiol (NYLIA 1/35, 28,) 1-35 mg-mcg per tablet; Take 1 tablet by mouth once daily.            Orders Placed This Encounter   Procedures    HIV 1/2 Ag/Ab (4th Gen)    Hepatitis Panel, Acute    Treponema Pallidium Antibodies IgG, IgM    C. trachomatis/N. gonorrhoeae by AMP DNA           Well Woman:  - Pap smear done todya  - Birth control: refilled  - GC/CT:done today  - Mammogram: due age 41  - Smoking cessation: n/a  - Labs: HIV, Syphilis (FTA Abs), Hepatitis   - Vaccines: s/p hpv  - does not want ultrasound; Ca 125 ordered to be done with labs.       Rani:  - Plan for annual visits;   -consider EMB at age 31; has had first colonoscopy, has already met with Breast surgery,     Dr. 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 The estimated endometrial cancer risk by age 40 years in women with Saravia syndrome is approximately 2-4%, and the estimated ovarian cancer risk is approximately 1-2%; by age 50 years, this risk increases to 8-17% and 3-7%, respectively , for this reason RR Hyst/BSO is recommended age 40-45.  - Gyn Screening recommendations until definitive surgery include:        - Endometrial biopsy every 1-2 years, beginning at age 30       - Keeping a menstrual  calendar and evaluating abnormal uterine bleeding.    - Available screening strategies have a limited ability to detect ovarian cancer at an early, more curable stage of disease, and there is no evidence that screening has reduced the mortality or improved the survival associated with ovarian cancer in high-risk populations.  Nevertheless,  screening is reasonable with TV US and  yearly if she would like it. She declines.   - For  now, she can continue routine gynecologic care with her established OBGYN with annual pelvic exam and pap smears per guidelines. She can see me yearly to check in. I encouraged her to explore [employer] website for support. She was given the opportunity to ask questions, and stated all had been answered.    Follow up in  one year for annual or prn.    Mandie Marsh

## 2025-04-09 NOTE — PATIENT INSTRUCTIONS
Mental Health Therapists:    Gloria Parada [phone] (email [email or web address])    Ama Nuñez [phone]    Jaz Vázquez [phone]    Vandana Queen [phone]    Jenae Watkins [phone]    Danielle Parikh [phone]    Betsy Rosas [phone]    Emilia Agustin [phone]    Daniel Keenan [phone]    Betsy Caro [phone]     Refill sent to pharmacy

## 2025-04-11 ENCOUNTER — RESULTS FOLLOW-UP (OUTPATIENT)
Dept: OBSTETRICS AND GYNECOLOGY | Facility: CLINIC | Age: 30
End: 2025-04-11

## 2025-04-11 ENCOUNTER — LAB VISIT (OUTPATIENT)
Dept: LAB | Facility: HOSPITAL | Age: 30
End: 2025-04-11
Attending: OBSTETRICS & GYNECOLOGY
Payer: COMMERCIAL

## 2025-04-11 DIAGNOSIS — Z11.3 SCREEN FOR STD (SEXUALLY TRANSMITTED DISEASE): ICD-10-CM

## 2025-04-11 DIAGNOSIS — Z15.09 LYNCH SYNDROME: ICD-10-CM

## 2025-04-11 LAB
CANCER AG125 SERPL-ACNC: 9 UNIT/ML
HAV IGM SERPL QL IA: NORMAL
HBV CORE IGM SERPL QL IA: NORMAL
HBV SURFACE AG SERPL QL IA: NORMAL
HCV AB SERPL QL IA: NORMAL
HIV 1+2 AB+HIV1 P24 AG SERPL QL IA: NORMAL
T PALLIDUM IGG+IGM SER QL: NORMAL

## 2025-04-11 PROCEDURE — 87389 HIV-1 AG W/HIV-1&-2 AB AG IA: CPT

## 2025-04-11 PROCEDURE — 80074 ACUTE HEPATITIS PANEL: CPT

## 2025-04-11 PROCEDURE — 86304 IMMUNOASSAY TUMOR CA 125: CPT

## 2025-04-11 PROCEDURE — 86593 SYPHILIS TEST NON-TREP QUANT: CPT

## 2025-04-11 PROCEDURE — 36415 COLL VENOUS BLD VENIPUNCTURE: CPT | Mod: PO

## 2025-04-12 LAB
C TRACH DNA SPEC QL NAA+PROBE: NOT DETECTED
CTGC SOURCE (OHS) ORD-325: NORMAL
N GONORRHOEA DNA UR QL NAA+PROBE: NOT DETECTED